# Patient Record
Sex: FEMALE | Race: WHITE | NOT HISPANIC OR LATINO | Employment: OTHER | URBAN - METROPOLITAN AREA
[De-identification: names, ages, dates, MRNs, and addresses within clinical notes are randomized per-mention and may not be internally consistent; named-entity substitution may affect disease eponyms.]

---

## 2017-01-04 ENCOUNTER — HISTORICAL (OUTPATIENT)
Dept: LAB | Facility: HOSPITAL | Age: 57
End: 2017-01-04

## 2017-01-24 ENCOUNTER — HISTORICAL (OUTPATIENT)
Dept: ADMINISTRATIVE | Facility: HOSPITAL | Age: 57
End: 2017-01-24

## 2017-04-25 ENCOUNTER — HISTORICAL (OUTPATIENT)
Dept: ADMINISTRATIVE | Facility: HOSPITAL | Age: 57
End: 2017-04-25

## 2017-05-02 ENCOUNTER — HISTORICAL (OUTPATIENT)
Dept: SURGERY | Facility: HOSPITAL | Age: 57
End: 2017-05-02

## 2017-05-15 ENCOUNTER — HISTORICAL (OUTPATIENT)
Dept: LAB | Facility: HOSPITAL | Age: 57
End: 2017-05-15

## 2017-05-15 LAB
ABS NEUT (OLG): 4.19 X10(3)/MCL (ref 2.1–9.2)
ALBUMIN SERPL-MCNC: 3.5 GM/DL (ref 3.4–5)
ALBUMIN/GLOB SERPL: 1.2 RATIO (ref 1.1–2)
ALP SERPL-CCNC: 88 UNIT/L (ref 38–126)
ALT SERPL-CCNC: 30 UNIT/L (ref 12–78)
AST SERPL-CCNC: 22 UNIT/L (ref 15–37)
BASOPHILS # BLD AUTO: 0 X10(3)/MCL (ref 0–0.2)
BASOPHILS NFR BLD AUTO: 0 %
BILIRUB SERPL-MCNC: 0.5 MG/DL (ref 0.2–1)
BILIRUBIN DIRECT+TOT PNL SERPL-MCNC: 0.1 MG/DL (ref 0–0.5)
BILIRUBIN DIRECT+TOT PNL SERPL-MCNC: 0.4 MG/DL (ref 0–0.8)
BUN SERPL-MCNC: 28 MG/DL (ref 7–18)
CALCIUM SERPL-MCNC: 8.8 MG/DL (ref 8.5–10.1)
CHLORIDE SERPL-SCNC: 108 MMOL/L (ref 98–107)
CHOLEST SERPL-MCNC: 161 MG/DL (ref 0–200)
CHOLEST/HDLC SERPL: 3.7 {RATIO} (ref 0–4)
CO2 SERPL-SCNC: 27 MMOL/L (ref 21–32)
CREAT SERPL-MCNC: 0.9 MG/DL (ref 0.55–1.02)
EOSINOPHIL # BLD AUTO: 0.1 X10(3)/MCL (ref 0–0.9)
EOSINOPHIL NFR BLD AUTO: 2 %
ERYTHROCYTE [DISTWIDTH] IN BLOOD BY AUTOMATED COUNT: 13 % (ref 11.5–17)
GLOBULIN SER-MCNC: 2.9 GM/DL (ref 2.4–3.5)
GLUCOSE SERPL-MCNC: 89 MG/DL (ref 74–106)
HCT VFR BLD AUTO: 39.6 % (ref 37–47)
HDLC SERPL-MCNC: 43 MG/DL (ref 35–60)
HGB BLD-MCNC: 12.5 GM/DL (ref 12–16)
LDLC SERPL CALC-MCNC: 91 MG/DL (ref 0–129)
LYMPHOCYTES # BLD AUTO: 1.5 X10(3)/MCL (ref 0.6–4.6)
LYMPHOCYTES NFR BLD AUTO: 24 %
MCH RBC QN AUTO: 28.2 PG (ref 27–31)
MCHC RBC AUTO-ENTMCNC: 31.6 GM/DL (ref 33–36)
MCV RBC AUTO: 89.4 FL (ref 80–94)
MONOCYTES # BLD AUTO: 0.4 X10(3)/MCL (ref 0.1–1.3)
MONOCYTES NFR BLD AUTO: 6 %
NEUTROPHILS # BLD AUTO: 4.19 X10(3)/MCL (ref 1.4–7.9)
NEUTROPHILS NFR BLD AUTO: 68 %
PLATELET # BLD AUTO: 121 X10(3)/MCL (ref 130–400)
PMV BLD AUTO: 11.2 FL (ref 9.4–12.4)
POTASSIUM SERPL-SCNC: 4.2 MMOL/L (ref 3.5–5.1)
PROT SERPL-MCNC: 6.4 GM/DL (ref 6.4–8.2)
RBC # BLD AUTO: 4.43 X10(6)/MCL (ref 4.2–5.4)
SODIUM SERPL-SCNC: 145 MMOL/L (ref 136–145)
TRIGL SERPL-MCNC: 133 MG/DL (ref 30–150)
TSH SERPL-ACNC: 1.87 MIU/ML (ref 0.36–3.74)
VLDLC SERPL CALC-MCNC: 27 MG/DL
WBC # SPEC AUTO: 6.2 X10(3)/MCL (ref 4.5–11.5)

## 2017-05-23 ENCOUNTER — HISTORICAL (OUTPATIENT)
Dept: ADMINISTRATIVE | Facility: HOSPITAL | Age: 57
End: 2017-05-23

## 2017-06-07 ENCOUNTER — HISTORICAL (OUTPATIENT)
Dept: LAB | Facility: HOSPITAL | Age: 57
End: 2017-06-07

## 2017-06-10 LAB
FINAL CULTURE: NO GROWTH
FINAL CULTURE: NORMAL

## 2017-07-10 LAB — FINAL CULTURE: NORMAL

## 2017-07-11 ENCOUNTER — HISTORICAL (OUTPATIENT)
Dept: CARDIOLOGY | Facility: HOSPITAL | Age: 57
End: 2017-07-11

## 2017-07-11 LAB
ABS NEUT (OLG): 2.84 X10(3)/MCL (ref 2.1–9.2)
APTT PPP: 29.9 SECOND(S) (ref 20.6–36)
BASOPHILS # BLD AUTO: 0 X10(3)/MCL (ref 0–0.2)
BASOPHILS NFR BLD AUTO: 0 %
EOSINOPHIL # BLD AUTO: 0.1 X10(3)/MCL (ref 0–0.9)
EOSINOPHIL NFR BLD AUTO: 2 %
ERYTHROCYTE [DISTWIDTH] IN BLOOD BY AUTOMATED COUNT: 12.7 % (ref 11.5–17)
HCT VFR BLD AUTO: 39.2 % (ref 37–47)
HGB BLD-MCNC: 12.9 GM/DL (ref 12–16)
INR PPP: 0.94 (ref 0–1.27)
LYMPHOCYTES # BLD AUTO: 2.2 X10(3)/MCL (ref 0.6–4.6)
LYMPHOCYTES NFR BLD AUTO: 39 %
MCH RBC QN AUTO: 28.4 PG (ref 27–31)
MCHC RBC AUTO-ENTMCNC: 32.9 GM/DL (ref 33–36)
MCV RBC AUTO: 86.3 FL (ref 80–94)
MONOCYTES # BLD AUTO: 0.5 X10(3)/MCL (ref 0.1–1.3)
MONOCYTES NFR BLD AUTO: 9 %
NEUTROPHILS # BLD AUTO: 2.84 X10(3)/MCL (ref 1.4–7.9)
NEUTROPHILS NFR BLD AUTO: 49 %
PLATELET # BLD AUTO: 164 X10(3)/MCL (ref 130–400)
PMV BLD AUTO: 10.8 FL (ref 9.4–12.4)
PROTHROMBIN TIME: 12.4 SECOND(S) (ref 12.1–14.2)
RBC # BLD AUTO: 4.54 X10(6)/MCL (ref 4.2–5.4)
WBC # SPEC AUTO: 5.8 X10(3)/MCL (ref 4.5–11.5)

## 2017-07-28 ENCOUNTER — HISTORICAL (OUTPATIENT)
Dept: LAB | Facility: HOSPITAL | Age: 57
End: 2017-07-28

## 2017-07-28 LAB
BILIRUB SERPL-MCNC: NEGATIVE MG/DL
BLOOD URINE, POC: NEGATIVE
CLARITY, POC UA: NORMAL
COLOR, POC UA: YELLOW
GLUCOSE UR QL STRIP: NEGATIVE
KETONES UR QL STRIP: NEGATIVE
LEUKOCYTE EST, POC UA: NORMAL
NITRITE, POC UA: NEGATIVE
PH, POC UA: 6.5
PROTEIN, POC: NEGATIVE
SPECIFIC GRAVITY, POC UA: 1.01
UROBILINOGEN, POC UA: NORMAL

## 2017-07-30 LAB — FINAL CULTURE: NO GROWTH

## 2017-09-20 ENCOUNTER — HISTORICAL (OUTPATIENT)
Dept: RADIOLOGY | Facility: HOSPITAL | Age: 57
End: 2017-09-20

## 2017-09-27 ENCOUNTER — HISTORICAL (OUTPATIENT)
Dept: LAB | Facility: HOSPITAL | Age: 57
End: 2017-09-27

## 2017-09-27 LAB
ABS NEUT (OLG): 3.3 X10(3)/MCL (ref 2.1–9.2)
ALBUMIN SERPL-MCNC: 3.8 GM/DL (ref 3.4–5)
ALBUMIN/GLOB SERPL: 1.3 {RATIO}
ALP SERPL-CCNC: 121 UNIT/L (ref 38–126)
ALT SERPL-CCNC: 26 UNIT/L (ref 12–78)
AST SERPL-CCNC: 19 UNIT/L (ref 15–37)
BASOPHILS # BLD AUTO: 0 X10(3)/MCL (ref 0–0.2)
BASOPHILS NFR BLD AUTO: 0 %
BILIRUB SERPL-MCNC: 0.3 MG/DL (ref 0.2–1)
BILIRUBIN DIRECT+TOT PNL SERPL-MCNC: 0.1 MG/DL (ref 0–0.2)
BILIRUBIN DIRECT+TOT PNL SERPL-MCNC: 0.2 MG/DL (ref 0–0.8)
BUN SERPL-MCNC: 15 MG/DL (ref 7–18)
CALCIUM SERPL-MCNC: 8.5 MG/DL (ref 8.5–10.1)
CHLORIDE SERPL-SCNC: 100 MMOL/L (ref 98–107)
CO2 SERPL-SCNC: 30 MMOL/L (ref 21–32)
CREAT SERPL-MCNC: 1.13 MG/DL (ref 0.55–1.02)
EOSINOPHIL # BLD AUTO: 0.1 X10(3)/MCL (ref 0–0.9)
EOSINOPHIL NFR BLD AUTO: 1 %
ERYTHROCYTE [DISTWIDTH] IN BLOOD BY AUTOMATED COUNT: 13.4 % (ref 11.5–17)
GLOBULIN SER-MCNC: 3 GM/DL (ref 2.4–3.5)
GLUCOSE SERPL-MCNC: 108 MG/DL (ref 74–106)
HCT VFR BLD AUTO: 39.4 % (ref 37–47)
HGB BLD-MCNC: 12.8 GM/DL (ref 12–16)
LYMPHOCYTES # BLD AUTO: 2.2 X10(3)/MCL (ref 0.6–4.6)
LYMPHOCYTES NFR BLD AUTO: 36 %
MCH RBC QN AUTO: 27.4 PG (ref 27–31)
MCHC RBC AUTO-ENTMCNC: 32.5 GM/DL (ref 33–36)
MCV RBC AUTO: 84.2 FL (ref 80–94)
MONOCYTES # BLD AUTO: 0.5 X10(3)/MCL (ref 0.1–1.3)
MONOCYTES NFR BLD AUTO: 8 %
NEUTROPHILS # BLD AUTO: 3.3 X10(3)/MCL (ref 2.1–9.2)
NEUTROPHILS NFR BLD AUTO: 54 %
PLATELET # BLD AUTO: 131 X10(3)/MCL (ref 130–400)
PMV BLD AUTO: 11.2 FL (ref 9.4–12.4)
POTASSIUM SERPL-SCNC: 2.8 MMOL/L (ref 3.5–5.1)
PROT SERPL-MCNC: 6.8 GM/DL (ref 6.4–8.2)
RBC # BLD AUTO: 4.68 X10(6)/MCL (ref 4.2–5.4)
SODIUM SERPL-SCNC: 138 MMOL/L (ref 136–145)
TSH SERPL-ACNC: 1.02 MIU/ML (ref 0.36–3.74)
WBC # SPEC AUTO: 6.1 X10(3)/MCL (ref 4.5–11.5)

## 2017-10-04 ENCOUNTER — HISTORICAL (OUTPATIENT)
Dept: LAB | Facility: HOSPITAL | Age: 57
End: 2017-10-04

## 2017-10-04 ENCOUNTER — HISTORICAL (OUTPATIENT)
Dept: RESPIRATORY THERAPY | Facility: HOSPITAL | Age: 57
End: 2017-10-04

## 2017-10-04 LAB
ABS NEUT (OLG): 9.37 X10(3)/MCL (ref 2.1–9.2)
ALBUMIN SERPL-MCNC: 3.8 GM/DL (ref 3.4–5)
ALBUMIN/GLOB SERPL: 1 {RATIO}
ALP SERPL-CCNC: 133 UNIT/L (ref 45–117)
ALT SERPL-CCNC: 52 UNIT/L (ref 13–56)
AST SERPL-CCNC: 23 UNIT/L (ref 15–37)
BILIRUB SERPL-MCNC: 0.3 MG/DL (ref 0.2–1)
BILIRUBIN DIRECT+TOT PNL SERPL-MCNC: 0.1 MG/DL (ref 0–0.2)
BILIRUBIN DIRECT+TOT PNL SERPL-MCNC: 0.2 MG/DL (ref 0–1)
BUN SERPL-MCNC: 20 MG/DL (ref 7–18)
CALCIUM SERPL-MCNC: 8.6 MG/DL (ref 8.5–10.1)
CHLORIDE SERPL-SCNC: 102 MMOL/L (ref 98–107)
CHOLEST SERPL-MCNC: 187 MG/DL (ref 0–200)
CHOLEST/HDLC SERPL: 3 {RATIO} (ref 0–4)
CO2 SERPL-SCNC: 30 MMOL/L (ref 21–32)
CORTIS SERPL-SCNC: 39.2 MCG/DL
CREAT SERPL-MCNC: 1.44 MG/DL (ref 0.55–1.02)
ERYTHROCYTE [DISTWIDTH] IN BLOOD BY AUTOMATED COUNT: 13.8 % (ref 11.5–17)
FT4I SERPL CALC-MCNC: 2.24 (ref 2.6–3.6)
GLOBULIN SER-MCNC: 4 GM/DL (ref 2–4)
GLUCOSE SERPL-MCNC: 89 MG/DL (ref 74–106)
HCT VFR BLD AUTO: 42.2 % (ref 37–47)
HDLC SERPL-MCNC: 63 MG/DL (ref 35–60)
HGB BLD-MCNC: 14.2 GM/DL (ref 12–16)
LDLC SERPL CALC-MCNC: 99 MG/DL (ref 0–129)
MCH RBC QN AUTO: 27.5 PG (ref 27–31)
MCHC RBC AUTO-ENTMCNC: 33.6 GM/DL (ref 33–36)
MCV RBC AUTO: 81.6 FL (ref 80–94)
PLATELET # BLD AUTO: 249 X10(3)/MCL (ref 130–400)
PMV BLD AUTO: 10.4 FL (ref 7.4–10.4)
POTASSIUM SERPL-SCNC: 4 MMOL/L (ref 3.5–5.1)
PROGEST SERPL-MCNC: <0.21 NG/ML
PROT SERPL-MCNC: 7.5 GM/DL (ref 6.4–8.2)
RBC # BLD AUTO: 5.17 X10(6)/MCL (ref 4.2–5.4)
SODIUM SERPL-SCNC: 140 MMOL/L (ref 136–145)
T3FREE SERPL-MCNC: 1.96 PG/ML (ref 2.18–3.98)
T3RU NFR SERPL: 32 % (ref 31–39)
T4 SERPL-MCNC: 7 MCG/DL (ref 4.7–13.3)
TESTOST SERPL-MCNC: <10 NG/DL (ref 14–76)
TRIGL SERPL-MCNC: 125 MG/DL (ref 30–150)
TSH SERPL-ACNC: 0.97 MIU/ML (ref 0.36–3.74)
VIT B12 SERPL-MCNC: 547 PG/ML (ref 193–986)
VLDLC SERPL CALC-MCNC: 25 MG/DL
WBC # SPEC AUTO: 12.1 X10(3)/MCL (ref 4.5–11.5)

## 2017-11-08 ENCOUNTER — HISTORICAL (OUTPATIENT)
Dept: LAB | Facility: HOSPITAL | Age: 57
End: 2017-11-08

## 2017-11-08 LAB
ABS NEUT (OLG): 6.05 X10(3)/MCL (ref 2.1–9.2)
BASOPHILS # BLD AUTO: 0.02 X10(3)/MCL (ref 0–0.2)
BASOPHILS NFR BLD AUTO: 0.2 % (ref 0–1)
BUN SERPL-MCNC: 17 MG/DL (ref 7–18)
CALCIUM SERPL-MCNC: 9.3 MG/DL (ref 8.5–10.1)
CHLORIDE SERPL-SCNC: 106 MMOL/L (ref 98–107)
CO2 SERPL-SCNC: 27 MMOL/L (ref 21–32)
CREAT SERPL-MCNC: 0.9 MG/DL (ref 0.55–1.02)
EOSINOPHIL # BLD AUTO: 0.04 X10(3)/MCL (ref 0–0.9)
EOSINOPHIL NFR BLD AUTO: 0.5 % (ref 0–6.4)
ERYTHROCYTE [DISTWIDTH] IN BLOOD BY AUTOMATED COUNT: 13.5 % (ref 11.5–17)
EST. AVERAGE GLUCOSE BLD GHB EST-MCNC: 117 MG/DL
GLUCOSE SERPL-MCNC: 100 MG/DL (ref 74–106)
HBA1C MFR BLD: 5.7 % (ref 4.2–6.3)
HCT VFR BLD AUTO: 40.8 % (ref 37–47)
HGB BLD-MCNC: 13.8 GM/DL (ref 12–16)
IMM GRANULOCYTES # BLD AUTO: 0.04 10*3/UL (ref 0–0.02)
IMM GRANULOCYTES NFR BLD AUTO: 0.5 % (ref 0–0.43)
LYMPHOCYTES # BLD AUTO: 2 X10(3)/MCL (ref 0.6–4.6)
LYMPHOCYTES NFR BLD AUTO: 22.9 % (ref 16–44)
MCH RBC QN AUTO: 28 PG (ref 27–31)
MCHC RBC AUTO-ENTMCNC: 33.8 GM/DL (ref 33–36)
MCV RBC AUTO: 82.8 FL (ref 80–94)
MONOCYTES # BLD AUTO: 0.6 X10(3)/MCL (ref 0.1–1.3)
MONOCYTES NFR BLD AUTO: 6.9 % (ref 4–12.1)
NEUTROPHILS # BLD AUTO: 6.05 X10(3)/MCL (ref 2.1–9.2)
NEUTROPHILS NFR BLD AUTO: 69 % (ref 43–73)
NRBC BLD AUTO-RTO: 0 % (ref 0–0.2)
PLATELET # BLD AUTO: 215 X10(3)/MCL (ref 130–400)
PMV BLD AUTO: 10.7 FL (ref 7.4–10.4)
POTASSIUM SERPL-SCNC: 3.9 MMOL/L (ref 3.5–5.1)
RBC # BLD AUTO: 4.93 X10(6)/MCL (ref 4.2–5.4)
SODIUM SERPL-SCNC: 141 MMOL/L (ref 136–145)
WBC # SPEC AUTO: 8.8 X10(3)/MCL (ref 4.5–11.5)

## 2017-11-14 ENCOUNTER — HISTORICAL (OUTPATIENT)
Dept: SURGERY | Facility: HOSPITAL | Age: 57
End: 2017-11-14

## 2017-12-14 ENCOUNTER — HISTORICAL (OUTPATIENT)
Dept: RADIOLOGY | Facility: HOSPITAL | Age: 57
End: 2017-12-14

## 2017-12-14 LAB
ABS NEUT (OLG): 3.67 X10(3)/MCL (ref 2.1–9.2)
BASOPHILS # BLD AUTO: 0.01 X10(3)/MCL (ref 0–0.2)
BASOPHILS NFR BLD AUTO: 0.2 % (ref 0–1)
EOSINOPHIL # BLD AUTO: 0.05 X10(3)/MCL (ref 0–0.9)
EOSINOPHIL NFR BLD AUTO: 0.9 % (ref 0–6.4)
ERYTHROCYTE [DISTWIDTH] IN BLOOD BY AUTOMATED COUNT: 13.2 % (ref 11.5–17)
HCT VFR BLD AUTO: 42.4 % (ref 37–47)
HGB BLD-MCNC: 14.2 GM/DL (ref 12–16)
IMM GRANULOCYTES # BLD AUTO: 0.01 10*3/UL (ref 0–0.02)
IMM GRANULOCYTES NFR BLD AUTO: 0.2 % (ref 0–0.43)
LYMPHOCYTES # BLD AUTO: 1.25 X10(3)/MCL (ref 0.6–4.6)
LYMPHOCYTES NFR BLD AUTO: 23.2 % (ref 16–44)
MCH RBC QN AUTO: 27.4 PG (ref 27–31)
MCHC RBC AUTO-ENTMCNC: 33.5 GM/DL (ref 33–36)
MCV RBC AUTO: 81.9 FL (ref 80–94)
MONOCYTES # BLD AUTO: 0.39 X10(3)/MCL (ref 0.1–1.3)
MONOCYTES NFR BLD AUTO: 7.2 % (ref 4–12.1)
NEUTROPHILS # BLD AUTO: 3.67 X10(3)/MCL (ref 2.1–9.2)
NEUTROPHILS NFR BLD AUTO: 68.3 % (ref 43–73)
NRBC BLD AUTO-RTO: 0 % (ref 0–0.2)
PLATELET # BLD AUTO: 108 X10(3)/MCL (ref 130–400)
PMV BLD AUTO: 11.4 FL (ref 7.4–10.4)
RBC # BLD AUTO: 5.18 X10(6)/MCL (ref 4.2–5.4)
WBC # SPEC AUTO: 5.4 X10(3)/MCL (ref 4.5–11.5)

## 2017-12-28 ENCOUNTER — HISTORICAL (OUTPATIENT)
Dept: RADIOLOGY | Facility: HOSPITAL | Age: 57
End: 2017-12-28

## 2018-01-12 ENCOUNTER — HISTORICAL (OUTPATIENT)
Dept: LAB | Facility: HOSPITAL | Age: 58
End: 2018-01-12

## 2018-01-12 LAB
BUN SERPL-MCNC: 21 MG/DL (ref 10–20)
CALCIUM SERPL-MCNC: 9.2 MG/DL (ref 8–10.5)
CHLORIDE SERPL-SCNC: 109 MMOL/L (ref 100–108)
CO2 SERPL-SCNC: 28 MMOL/L (ref 21–35)
CREAT SERPL-MCNC: 0.87 MG/DL (ref 0.7–1.3)
GLUCOSE SERPL-MCNC: 96 MG/DL (ref 75–116)
POTASSIUM SERPL-SCNC: 3.3 MMOL/L (ref 3.6–5.2)
SODIUM SERPL-SCNC: 146 MMOL/L (ref 135–145)

## 2018-01-25 ENCOUNTER — HISTORICAL (OUTPATIENT)
Dept: ADMINISTRATIVE | Facility: HOSPITAL | Age: 58
End: 2018-01-25

## 2018-01-25 LAB
BUN SERPL-MCNC: 15 MG/DL (ref 7–18)
CALCIUM SERPL-MCNC: 9.3 MG/DL (ref 8.5–10.1)
CHLORIDE SERPL-SCNC: 102 MMOL/L (ref 98–107)
CO2 SERPL-SCNC: 27 MMOL/L (ref 21–32)
CREAT SERPL-MCNC: 1.14 MG/DL (ref 0.55–1.02)
FT4I SERPL CALC-MCNC: 4.09 (ref 2.6–3.6)
GLUCOSE SERPL-MCNC: 100 MG/DL (ref 74–106)
POTASSIUM SERPL-SCNC: 2.9 MMOL/L (ref 3.5–5.1)
SODIUM SERPL-SCNC: 140 MMOL/L (ref 136–145)
T3FREE SERPL-MCNC: 4.89 PG/ML (ref 2.18–3.98)
T3RU NFR SERPL: 33 % (ref 31–39)
T4 SERPL-MCNC: 12.4 MCG/DL (ref 4.7–13.3)
TSH SERPL-ACNC: 2.42 MIU/ML (ref 0.36–3.74)

## 2018-02-02 ENCOUNTER — HISTORICAL (OUTPATIENT)
Dept: LAB | Facility: HOSPITAL | Age: 58
End: 2018-02-02

## 2018-02-19 ENCOUNTER — HISTORICAL (OUTPATIENT)
Dept: LAB | Facility: HOSPITAL | Age: 58
End: 2018-02-19

## 2018-03-09 ENCOUNTER — HISTORICAL (OUTPATIENT)
Dept: LAB | Facility: HOSPITAL | Age: 58
End: 2018-03-09

## 2018-03-09 ENCOUNTER — HISTORICAL (OUTPATIENT)
Dept: PREADMISSION TESTING | Facility: HOSPITAL | Age: 58
End: 2018-03-09

## 2018-03-12 ENCOUNTER — HISTORICAL (OUTPATIENT)
Dept: LAB | Facility: HOSPITAL | Age: 58
End: 2018-03-12

## 2018-03-12 ENCOUNTER — HISTORICAL (OUTPATIENT)
Dept: PREADMISSION TESTING | Facility: HOSPITAL | Age: 58
End: 2018-03-12

## 2018-03-14 ENCOUNTER — HISTORICAL (OUTPATIENT)
Dept: LAB | Facility: HOSPITAL | Age: 58
End: 2018-03-14

## 2018-03-14 ENCOUNTER — HISTORICAL (OUTPATIENT)
Dept: PREADMISSION TESTING | Facility: HOSPITAL | Age: 58
End: 2018-03-14

## 2018-03-16 ENCOUNTER — HISTORICAL (OUTPATIENT)
Dept: SURGERY | Facility: HOSPITAL | Age: 58
End: 2018-03-16

## 2018-06-13 ENCOUNTER — HISTORICAL (OUTPATIENT)
Dept: RADIOLOGY | Facility: HOSPITAL | Age: 58
End: 2018-06-13

## 2018-08-01 ENCOUNTER — HISTORICAL (OUTPATIENT)
Dept: RADIOLOGY | Facility: HOSPITAL | Age: 58
End: 2018-08-01

## 2018-08-15 ENCOUNTER — HISTORICAL (OUTPATIENT)
Dept: LAB | Facility: HOSPITAL | Age: 58
End: 2018-08-15

## 2018-08-15 LAB
ABS NEUT (OLG): 3.7 X10(3)/MCL (ref 1.5–6.9)
BASOPHILS # BLD AUTO: 0 X10(3)/MCL (ref 0–0.1)
BASOPHILS NFR BLD AUTO: 0 % (ref 0–1)
EOSINOPHIL # BLD AUTO: 0.2 X10(3)/MCL (ref 0–0.6)
EOSINOPHIL NFR BLD AUTO: 2 % (ref 0–5)
ERYTHROCYTE [DISTWIDTH] IN BLOOD BY AUTOMATED COUNT: 13.9 % (ref 11.5–17)
HCT VFR BLD AUTO: 41.4 % (ref 36–48)
HGB BLD-MCNC: 13.6 GM/DL (ref 12–16)
INR PPP: 0.9 (ref 0–1.2)
LYMPHOCYTES # BLD AUTO: 2.4 X10(3)/MCL (ref 0.5–4.1)
LYMPHOCYTES NFR BLD AUTO: 34.8 % (ref 15–40)
MCH RBC QN AUTO: 28 PG (ref 27–34)
MCHC RBC AUTO-ENTMCNC: 33 GM/DL (ref 31–36)
MCV RBC AUTO: 85 FL (ref 80–99)
MONOCYTES # BLD AUTO: 0.6 X10(3)/MCL (ref 0–1.1)
MONOCYTES NFR BLD AUTO: 8 % (ref 4–12)
NEUTROPHILS # BLD AUTO: 3.7 X10(3)/MCL (ref 1.5–6.9)
NEUTROPHILS NFR BLD AUTO: 54 % (ref 43–75)
PLATELET # BLD AUTO: 169 X10(3)/MCL (ref 140–400)
PMV BLD AUTO: 11.4 FL (ref 6.8–10)
PROTHROMBIN TIME: 9.8 SECOND(S) (ref 9–12)
RBC # BLD AUTO: 4.88 X10(6)/MCL (ref 4.2–5.4)
WBC # SPEC AUTO: 6.9 X10(3)/MCL (ref 4.5–11.5)

## 2018-08-21 ENCOUNTER — HISTORICAL (OUTPATIENT)
Dept: LAB | Facility: HOSPITAL | Age: 58
End: 2018-08-21

## 2018-08-21 LAB
ABS NEUT (OLG): 4.2 X10(3)/MCL (ref 1.5–6.9)
ALBUMIN SERPL-MCNC: 3.1 GM/DL (ref 3.4–5)
ALBUMIN/GLOB SERPL: 1 RATIO
ALP SERPL-CCNC: 109 UNIT/L (ref 30–113)
ALT SERPL-CCNC: 22 UNIT/L (ref 10–45)
APTT PPP: 23.5 SECOND(S) (ref 25–35)
AST SERPL-CCNC: 13 UNIT/L (ref 15–37)
BASOPHILS # BLD AUTO: 0 X10(3)/MCL (ref 0–0.1)
BASOPHILS NFR BLD AUTO: 0 % (ref 0–1)
BILIRUB SERPL-MCNC: 0.4 MG/DL (ref 0.1–0.9)
BILIRUBIN DIRECT+TOT PNL SERPL-MCNC: 0.1 MG/DL (ref 0–0.3)
BILIRUBIN DIRECT+TOT PNL SERPL-MCNC: 0.3 MG/DL
BUN SERPL-MCNC: 17 MG/DL (ref 10–20)
CALCIUM SERPL-MCNC: 8.7 MG/DL (ref 8–10.5)
CHLORIDE SERPL-SCNC: 106 MMOL/L (ref 100–108)
CHOLEST SERPL-MCNC: 180 MG/DL (ref 140–200)
CHOLEST/HDLC SERPL: 4 MG/DL (ref 0–8)
CO2 SERPL-SCNC: 36 MMOL/L (ref 21–35)
CREAT SERPL-MCNC: 0.92 MG/DL (ref 0.7–1.3)
DEPRECATED CALCIDIOL+CALCIFEROL SERPL-MC: 23.63 NG/ML (ref 30–80)
EOSINOPHIL # BLD AUTO: 0.1 X10(3)/MCL (ref 0–0.6)
EOSINOPHIL NFR BLD AUTO: 1 % (ref 0–5)
ERYTHROCYTE [DISTWIDTH] IN BLOOD BY AUTOMATED COUNT: 14.2 % (ref 11.5–17)
ERYTHROCYTE [SEDIMENTATION RATE] IN BLOOD: 12 MM/HR (ref 0–20)
FT4I SERPL CALC-MCNC: 3.5 (ref 5.9–13.1)
GLOBULIN SER-MCNC: 3.2 GM/DL
GLUCOSE SERPL-MCNC: 89 MG/DL (ref 75–116)
HCT VFR BLD AUTO: 40.8 % (ref 36–48)
HDLC SERPL-MCNC: 43 MG/DL (ref 35–59)
HGB BLD-MCNC: 13.3 GM/DL (ref 12–16)
INR PPP: 0.9 (ref 0–1.2)
LDLC SERPL CALC-MCNC: 115 MG/DL (ref 100–129)
LYMPHOCYTES # BLD AUTO: 2.5 X10(3)/MCL (ref 0.5–4.1)
LYMPHOCYTES NFR BLD AUTO: 33.7 % (ref 15–40)
MCH RBC QN AUTO: 28 PG (ref 27–34)
MCHC RBC AUTO-ENTMCNC: 33 GM/DL (ref 31–36)
MCV RBC AUTO: 86 FL (ref 80–99)
MONOCYTES # BLD AUTO: 0.5 X10(3)/MCL (ref 0–1.1)
MONOCYTES NFR BLD AUTO: 7 % (ref 4–12)
NEUTROPHILS # BLD AUTO: 4.2 X10(3)/MCL (ref 1.5–6.9)
NEUTROPHILS NFR BLD AUTO: 57 % (ref 43–75)
PLATELET # BLD AUTO: 163 X10(3)/MCL (ref 140–400)
PMV BLD AUTO: 11.2 FL (ref 6.8–10)
POTASSIUM SERPL-SCNC: 2.9 MMOL/L (ref 3.6–5.2)
PROT SERPL-MCNC: 6.3 GM/DL (ref 6.4–8.2)
PROTHROMBIN TIME: 9.7 SECOND(S) (ref 9–12)
RBC # BLD AUTO: 4.73 X10(6)/MCL (ref 4.2–5.4)
SODIUM SERPL-SCNC: 143 MMOL/L (ref 135–145)
T3RU NFR SERPL: 36 % (ref 30–39)
T4 SERPL-MCNC: 9.8 MCG/DL (ref 5.3–11.5)
TRIGL SERPL-MCNC: 192 MG/DL (ref 35–150)
TSH SERPL-ACNC: 5.06 MIU/ML (ref 0.36–3.74)
VIT B12 SERPL-MCNC: 499 PG/ML (ref 193–986)
VLDLC SERPL CALC-MCNC: 38 MG/DL
WBC # SPEC AUTO: 7.3 X10(3)/MCL (ref 4.5–11.5)

## 2018-08-29 ENCOUNTER — HISTORICAL (OUTPATIENT)
Dept: LAB | Facility: HOSPITAL | Age: 58
End: 2018-08-29

## 2018-08-29 LAB
BUN SERPL-MCNC: 20 MG/DL (ref 10–20)
CALCIUM SERPL-MCNC: 8.8 MG/DL (ref 8–10.5)
CHLORIDE SERPL-SCNC: 104 MMOL/L (ref 100–108)
CO2 SERPL-SCNC: 28 MMOL/L (ref 21–35)
CREAT SERPL-MCNC: 0.92 MG/DL (ref 0.7–1.3)
CREAT/UREA NIT SERPL: 22
GLUCOSE SERPL-MCNC: 97 MG/DL (ref 75–116)
POTASSIUM SERPL-SCNC: 3.5 MMOL/L (ref 3.6–5.2)
SODIUM SERPL-SCNC: 143 MMOL/L (ref 135–145)

## 2018-10-02 ENCOUNTER — HISTORICAL (OUTPATIENT)
Dept: LAB | Facility: HOSPITAL | Age: 58
End: 2018-10-02

## 2018-10-02 LAB
FT4I SERPL CALC-MCNC: 2.9 (ref 5.9–13.1)
T3FREE SERPL-MCNC: 3.76 PG/ML (ref 2.18–3.98)
T3RU NFR SERPL: 35 % (ref 30–39)
T4 SERPL-MCNC: 8.4 MCG/DL (ref 5.3–11.5)
TSH SERPL-ACNC: 2.19 MIU/ML (ref 0.36–3.74)

## 2018-10-03 ENCOUNTER — HISTORICAL (OUTPATIENT)
Dept: ADMINISTRATIVE | Facility: HOSPITAL | Age: 58
End: 2018-10-03

## 2018-10-03 ENCOUNTER — HISTORICAL (OUTPATIENT)
Dept: RADIOLOGY | Facility: HOSPITAL | Age: 58
End: 2018-10-03

## 2018-10-25 ENCOUNTER — HISTORICAL (OUTPATIENT)
Dept: RADIOLOGY | Facility: HOSPITAL | Age: 58
End: 2018-10-25

## 2019-06-11 ENCOUNTER — HISTORICAL (OUTPATIENT)
Dept: LAB | Facility: HOSPITAL | Age: 59
End: 2019-06-11

## 2019-06-11 LAB
ABS NEUT (OLG): 3.4 X10(3)/MCL (ref 1.5–6.9)
ALBUMIN SERPL-MCNC: 3.7 GM/DL (ref 3.4–5)
ALBUMIN/GLOB SERPL: 1.1 RATIO
ALP SERPL-CCNC: 122 UNIT/L (ref 30–113)
ALT SERPL-CCNC: 23 UNIT/L (ref 10–45)
AST SERPL-CCNC: 20 UNIT/L (ref 15–37)
BILIRUB SERPL-MCNC: 0.6 MG/DL (ref 0.1–0.9)
BILIRUBIN DIRECT+TOT PNL SERPL-MCNC: 0.1 MG/DL (ref 0–0.3)
BILIRUBIN DIRECT+TOT PNL SERPL-MCNC: 0.5 MG/DL
BUN SERPL-MCNC: 19 MG/DL (ref 10–20)
CALCIUM SERPL-MCNC: 9.2 MG/DL (ref 8–10.5)
CHLORIDE SERPL-SCNC: 101 MMOL/L (ref 100–108)
CHOLEST SERPL-MCNC: 190 MG/DL (ref 140–200)
CHOLEST/HDLC SERPL: 4 MG/DL (ref 0–8)
CO2 SERPL-SCNC: 30 MMOL/L (ref 21–35)
CREAT SERPL-MCNC: 1.01 MG/DL (ref 0.7–1.3)
ERYTHROCYTE [DISTWIDTH] IN BLOOD BY AUTOMATED COUNT: 13.3 % (ref 11.5–17)
EST. AVERAGE GLUCOSE BLD GHB EST-MCNC: 117 MG/DL
FT4I SERPL CALC-MCNC: 2.4 (ref 5.9–13.1)
GLOBULIN SER-MCNC: 3.5 GM/DL
GLUCOSE SERPL-MCNC: 136 MG/DL (ref 75–116)
HBA1C MFR BLD: 5.7 % (ref 4.8–6)
HCT VFR BLD AUTO: 43.5 % (ref 36–48)
HDLC SERPL-MCNC: 45 MG/DL (ref 35–59)
HGB BLD-MCNC: 13.8 GM/DL (ref 12–16)
LDLC SERPL CALC-MCNC: 125 MG/DL (ref 100–129)
MCH RBC QN AUTO: 28 PG (ref 27–34)
MCHC RBC AUTO-ENTMCNC: 32 GM/DL (ref 31–36)
MCV RBC AUTO: 87 FL (ref 80–99)
PLATELET # BLD AUTO: 175 X10(3)/MCL (ref 140–400)
PMV BLD AUTO: 11.3 FL (ref 6.8–10)
POTASSIUM SERPL-SCNC: 3.3 MMOL/L (ref 3.6–5.2)
PROT SERPL-MCNC: 7.2 GM/DL (ref 6.4–8.2)
RBC # BLD AUTO: 4.98 X10(6)/MCL (ref 4.2–5.4)
SODIUM SERPL-SCNC: 140 MMOL/L (ref 135–145)
T3FREE SERPL-MCNC: 3.08 PG/ML (ref 2.18–3.98)
T3RU NFR SERPL: 32 % (ref 30–39)
T4 SERPL-MCNC: 7.6 MCG/DL (ref 5.3–11.5)
TRIGL SERPL-MCNC: 171 MG/DL (ref 35–150)
TSH SERPL-ACNC: 1.55 MIU/ML (ref 0.36–3.74)
VLDLC SERPL CALC-MCNC: 34 MG/DL
WBC # SPEC AUTO: 5.6 X10(3)/MCL (ref 4.5–11.5)

## 2019-09-03 LAB — CRC RECOMMENDATION EXT: NORMAL

## 2019-10-03 ENCOUNTER — HISTORICAL (OUTPATIENT)
Dept: RADIOLOGY | Facility: HOSPITAL | Age: 59
End: 2019-10-03

## 2019-10-30 ENCOUNTER — HISTORICAL (OUTPATIENT)
Dept: LAB | Facility: HOSPITAL | Age: 59
End: 2019-10-30

## 2019-10-30 LAB
DEPRECATED CALCIDIOL+CALCIFEROL SERPL-MC: 25.9 NG/ML (ref 30–80)
VIT B12 SERPL-MCNC: 575 PG/ML (ref 193–986)

## 2019-12-17 ENCOUNTER — HISTORICAL (OUTPATIENT)
Dept: LAB | Facility: HOSPITAL | Age: 59
End: 2019-12-17

## 2019-12-17 LAB
ABS NEUT (OLG): 3.5 X10(3)/MCL (ref 1.5–6.9)
ALBUMIN SERPL-MCNC: 4.2 GM/DL (ref 3.4–5)
ALBUMIN/GLOB SERPL: 1.2 RATIO
ALP SERPL-CCNC: 115 UNIT/L (ref 30–113)
ALT SERPL-CCNC: 25 UNIT/L (ref 10–45)
AST SERPL-CCNC: 21 UNIT/L (ref 15–37)
BILIRUB SERPL-MCNC: 0.5 MG/DL (ref 0.1–0.9)
BILIRUBIN DIRECT+TOT PNL SERPL-MCNC: 0.1 MG/DL (ref 0–0.3)
BILIRUBIN DIRECT+TOT PNL SERPL-MCNC: 0.4 MG/DL
BUN SERPL-MCNC: 24 MG/DL (ref 10–20)
CALCIUM SERPL-MCNC: 10.1 MG/DL (ref 8–10.5)
CHLORIDE SERPL-SCNC: 103 MMOL/L (ref 100–108)
CHOLEST SERPL-MCNC: 233 MG/DL (ref 140–200)
CHOLEST/HDLC SERPL: 4 MG/DL (ref 0–8)
CO2 SERPL-SCNC: 27 MMOL/L (ref 21–35)
CORTIS SERPL-SCNC: 12 MCG/DL
CREAT SERPL-MCNC: 0.79 MG/DL (ref 0.7–1.3)
DEPRECATED CALCIDIOL+CALCIFEROL SERPL-MC: 35.46 NG/ML (ref 30–80)
ERYTHROCYTE [DISTWIDTH] IN BLOOD BY AUTOMATED COUNT: 13.3 % (ref 11.5–17)
ESTRADIOL SERPL HS-MCNC: 12 PG/ML
GLOBULIN SER-MCNC: 3.6 GM/DL
GLUCOSE SERPL-MCNC: 96 MG/DL (ref 75–116)
HCT VFR BLD AUTO: 43.3 % (ref 36–48)
HDLC SERPL-MCNC: 53 MG/DL (ref 35–59)
HGB BLD-MCNC: 14.1 GM/DL (ref 12–16)
LDLC SERPL CALC-MCNC: 172 MG/DL (ref 100–129)
MCH RBC QN AUTO: 28 PG (ref 27–34)
MCHC RBC AUTO-ENTMCNC: 33 GM/DL (ref 31–36)
MCV RBC AUTO: 87 FL (ref 80–99)
PLATELET # BLD AUTO: 190 X10(3)/MCL (ref 140–400)
PMV BLD AUTO: 11 FL (ref 6.8–10)
POTASSIUM SERPL-SCNC: 3.6 MMOL/L (ref 3.6–5.2)
PROT SERPL-MCNC: 7.8 GM/DL (ref 6.4–8.2)
RBC # BLD AUTO: 4.97 X10(6)/MCL (ref 4.2–5.4)
SODIUM SERPL-SCNC: 142 MMOL/L (ref 135–145)
T3FREE SERPL-MCNC: 3.34 PG/ML (ref 2.18–3.98)
TRIGL SERPL-MCNC: 147 MG/DL (ref 35–150)
TSH SERPL-ACNC: 2.71 MIU/ML (ref 0.36–3.74)
VIT B12 SERPL-MCNC: 467 PG/ML (ref 193–986)
VLDLC SERPL CALC-MCNC: 29 MG/DL
WBC # SPEC AUTO: 5.7 X10(3)/MCL (ref 4.5–11.5)

## 2020-02-18 ENCOUNTER — HISTORICAL (OUTPATIENT)
Dept: RADIOLOGY | Facility: HOSPITAL | Age: 60
End: 2020-02-18

## 2020-05-25 ENCOUNTER — HISTORICAL (OUTPATIENT)
Dept: LAB | Facility: HOSPITAL | Age: 60
End: 2020-05-25

## 2020-05-25 LAB
ALBUMIN SERPL-MCNC: 3.9 GM/DL (ref 3.5–5)
ALBUMIN/GLOB SERPL: 1.3 RATIO (ref 1.1–2)
ALP SERPL-CCNC: 107 UNIT/L (ref 40–150)
ALT SERPL-CCNC: 11 UNIT/L (ref 0–55)
AST SERPL-CCNC: 18 UNIT/L (ref 5–34)
BILIRUB SERPL-MCNC: 0.5 MG/DL
BILIRUBIN DIRECT+TOT PNL SERPL-MCNC: 0.2 MG/DL (ref 0–0.5)
BILIRUBIN DIRECT+TOT PNL SERPL-MCNC: 0.3 MG/DL (ref 0–0.8)
BUN SERPL-MCNC: 26 MG/DL (ref 9.8–20.1)
CALCIUM SERPL-MCNC: 9.3 MG/DL (ref 8.4–10.2)
CHLORIDE SERPL-SCNC: 107 MMOL/L (ref 98–107)
CO2 SERPL-SCNC: 21 MMOL/L (ref 22–29)
CREAT SERPL-MCNC: 0.96 MG/DL (ref 0.55–1.02)
GLOBULIN SER-MCNC: 3 GM/DL (ref 2.4–3.5)
GLUCOSE SERPL-MCNC: 99 MG/DL (ref 74–100)
MAGNESIUM SERPL-MCNC: 2.03 MG/DL (ref 1.6–2.6)
POTASSIUM SERPL-SCNC: 4 MMOL/L (ref 3.5–5.1)
PROT SERPL-MCNC: 6.9 GM/DL (ref 6.4–8.3)
SODIUM SERPL-SCNC: 137 MMOL/L (ref 136–145)

## 2020-12-04 ENCOUNTER — HISTORICAL (OUTPATIENT)
Dept: RADIOLOGY | Facility: HOSPITAL | Age: 60
End: 2020-12-04

## 2021-06-02 ENCOUNTER — HISTORICAL (OUTPATIENT)
Dept: LAB | Facility: HOSPITAL | Age: 61
End: 2021-06-02

## 2021-06-02 LAB
ABS NEUT (OLG): 3.73 X10(3)/MCL (ref 2.1–9.2)
ALBUMIN SERPL-MCNC: 4.2 GM/DL (ref 3.5–5)
ALBUMIN/GLOB SERPL: 1.3 RATIO (ref 1.1–2)
ALP SERPL-CCNC: 118 UNIT/L (ref 40–150)
ALT SERPL-CCNC: 14 UNIT/L (ref 0–55)
ANTINUCLEAR ANTIBODY SCREEN (OHS): NEGATIVE
APTT PPP: 30.5 SEC (ref 23.4–34.9)
AST SERPL-CCNC: 17 UNIT/L (ref 5–34)
BASOPHILS # BLD AUTO: 0.02 X10(3)/MCL (ref 0–0.2)
BASOPHILS NFR BLD AUTO: 0.3 % (ref 0–1)
BILIRUB SERPL-MCNC: 0.6 MG/DL (ref 0.2–1.2)
BILIRUBIN DIRECT+TOT PNL SERPL-MCNC: 0.2 MG/DL (ref 0–0.5)
BILIRUBIN DIRECT+TOT PNL SERPL-MCNC: 0.4 MG/DL (ref 0–0.8)
BUN SERPL-MCNC: 15.2 MG/DL (ref 9.8–20.1)
CALCIUM SERPL-MCNC: 9.6 MG/DL (ref 8.4–10.2)
CHLORIDE SERPL-SCNC: 102 MMOL/L (ref 98–107)
CO2 SERPL-SCNC: 28 MMOL/L (ref 22–29)
CREAT SERPL-MCNC: 0.83 MG/DL (ref 0.57–1.11)
DSDNA ANTIBODY (OHS): NEGATIVE
EOSINOPHIL # BLD AUTO: 0.1 X10(3)/MCL (ref 0–0.9)
EOSINOPHIL NFR BLD AUTO: 1.7 % (ref 0–6.4)
ERYTHROCYTE [DISTWIDTH] IN BLOOD BY AUTOMATED COUNT: 13 % (ref 11.5–17)
ERYTHROCYTE [SEDIMENTATION RATE] IN BLOOD: 8 MM/HR (ref 0–30)
GLOBULIN SER-MCNC: 3.3 GM/DL (ref 2.4–3.5)
GLUCOSE SERPL-MCNC: 96 MG/DL (ref 74–100)
HCT VFR BLD AUTO: 44.9 % (ref 37–47)
HGB BLD-MCNC: 14.4 GM/DL (ref 12–16)
IMM GRANULOCYTES # BLD AUTO: 0.02 10*3/UL (ref 0–0.02)
IMM GRANULOCYTES NFR BLD AUTO: 0.3 % (ref 0–0.43)
INR PPP: 1.1 (ref 2–3)
LYMPHOCYTES # BLD AUTO: 1.78 X10(3)/MCL (ref 0.6–4.6)
LYMPHOCYTES NFR BLD AUTO: 29.4 % (ref 16–44)
MCH RBC QN AUTO: 27.6 PG (ref 27–31)
MCHC RBC AUTO-ENTMCNC: 32.1 GM/DL (ref 33–36)
MCV RBC AUTO: 86.2 FL (ref 80–94)
MONOCYTES # BLD AUTO: 0.4 X10(3)/MCL (ref 0.1–1.3)
MONOCYTES NFR BLD AUTO: 6.6 % (ref 4–12.1)
NEUTROPHILS # BLD AUTO: 3.73 X10(3)/MCL (ref 2.1–9.2)
NEUTROPHILS NFR BLD AUTO: 61.7 % (ref 43–73)
NRBC BLD AUTO-RTO: 0 % (ref 0–0.2)
PLATELET # BLD AUTO: 162 X10(3)/MCL (ref 130–400)
PMV BLD AUTO: 11 FL (ref 7.4–10.4)
POTASSIUM SERPL-SCNC: 3.8 MMOL/L (ref 3.5–5.1)
PROT SERPL-MCNC: 7.5 GM/DL (ref 6.4–8.3)
PROTHROMBIN TIME: 13.5 SEC (ref 11.7–14.5)
RBC # BLD AUTO: 5.21 X10(6)/MCL (ref 4.2–5.4)
SODIUM SERPL-SCNC: 141 MMOL/L (ref 136–145)
TSH SERPL-ACNC: 1.9 UIU/ML (ref 0.35–4.94)
WBC # SPEC AUTO: 6 X10(3)/MCL (ref 4.5–11.5)

## 2021-06-07 ENCOUNTER — HISTORICAL (OUTPATIENT)
Dept: RADIOLOGY | Facility: HOSPITAL | Age: 61
End: 2021-06-07

## 2021-06-28 ENCOUNTER — HISTORICAL (OUTPATIENT)
Dept: ADMINISTRATIVE | Facility: HOSPITAL | Age: 61
End: 2021-06-28

## 2021-06-28 LAB — GROUP & RH: NORMAL

## 2021-07-21 ENCOUNTER — HISTORICAL (OUTPATIENT)
Dept: LAB | Facility: HOSPITAL | Age: 61
End: 2021-07-21

## 2021-07-21 LAB
ABS NEUT (OLG): 3.93 X10(3)/MCL (ref 2.1–9.2)
ALBUMIN SERPL-MCNC: 3.8 GM/DL (ref 3.5–5)
ALBUMIN/GLOB SERPL: 1.2 RATIO (ref 1.1–2)
ALP SERPL-CCNC: 117 UNIT/L (ref 40–150)
ALT SERPL-CCNC: 11 UNIT/L (ref 0–55)
AST SERPL-CCNC: 16 UNIT/L (ref 5–34)
BASOPHILS # BLD AUTO: 0.01 X10(3)/MCL (ref 0–0.2)
BASOPHILS NFR BLD AUTO: 0.2 % (ref 0–1)
BILIRUB SERPL-MCNC: 0.3 MG/DL (ref 0.2–1.2)
BILIRUBIN DIRECT+TOT PNL SERPL-MCNC: <0.1 MG/DL (ref 0–0.5)
BILIRUBIN DIRECT+TOT PNL SERPL-MCNC: >0.2 MG/DL (ref 0–0.8)
BUN SERPL-MCNC: 19.6 MG/DL (ref 9.8–20.1)
CALCIUM SERPL-MCNC: 9.7 MG/DL (ref 8.4–10.2)
CHLORIDE SERPL-SCNC: 106 MMOL/L (ref 98–107)
CO2 SERPL-SCNC: 29 MMOL/L (ref 22–29)
CREAT SERPL-MCNC: 1.13 MG/DL (ref 0.57–1.11)
EOSINOPHIL # BLD AUTO: 0.16 X10(3)/MCL (ref 0–0.9)
EOSINOPHIL NFR BLD AUTO: 2.4 % (ref 0–6.4)
ERYTHROCYTE [DISTWIDTH] IN BLOOD BY AUTOMATED COUNT: 13.2 % (ref 11.5–17)
GLOBULIN SER-MCNC: 3.2 GM/DL (ref 2.4–3.5)
GLUCOSE SERPL-MCNC: 70 MG/DL (ref 74–100)
HCT VFR BLD AUTO: 41.6 % (ref 37–47)
HGB BLD-MCNC: 13.3 GM/DL (ref 12–16)
IMM GRANULOCYTES # BLD AUTO: 0.03 10*3/UL (ref 0–0.02)
IMM GRANULOCYTES NFR BLD AUTO: 0.5 % (ref 0–0.43)
LYMPHOCYTES # BLD AUTO: 1.89 X10(3)/MCL (ref 0.6–4.6)
LYMPHOCYTES NFR BLD AUTO: 28.8 % (ref 16–44)
MCH RBC QN AUTO: 28.5 PG (ref 27–31)
MCHC RBC AUTO-ENTMCNC: 32 GM/DL (ref 33–36)
MCV RBC AUTO: 89.3 FL (ref 80–94)
MONOCYTES # BLD AUTO: 0.54 X10(3)/MCL (ref 0.1–1.3)
MONOCYTES NFR BLD AUTO: 8.2 % (ref 4–12.1)
NEUTROPHILS # BLD AUTO: 3.93 X10(3)/MCL (ref 2.1–9.2)
NEUTROPHILS NFR BLD AUTO: 59.9 % (ref 43–73)
NRBC BLD AUTO-RTO: 0 % (ref 0–0.2)
PLATELET # BLD AUTO: 192 X10(3)/MCL (ref 130–400)
PMV BLD AUTO: 10.7 FL (ref 7.4–10.4)
POTASSIUM SERPL-SCNC: 3.4 MMOL/L (ref 3.5–5.1)
PROT SERPL-MCNC: 7 GM/DL (ref 6.4–8.3)
RBC # BLD AUTO: 4.66 X10(6)/MCL (ref 4.2–5.4)
SODIUM SERPL-SCNC: 146 MMOL/L (ref 136–145)
WBC # SPEC AUTO: 6.6 X10(3)/MCL (ref 4.5–11.5)

## 2021-11-18 ENCOUNTER — HISTORICAL (OUTPATIENT)
Dept: LAB | Facility: HOSPITAL | Age: 61
End: 2021-11-18

## 2021-11-18 LAB
ABS NEUT (OLG): 2.8 X10(3)/MCL (ref 1.5–6.9)
ALBUMIN SERPL-MCNC: 3.7 GM/DL (ref 3.4–4.8)
ALBUMIN/GLOB SERPL: 1.4 RATIO (ref 1.1–2)
ALP SERPL-CCNC: 115 UNIT/L (ref 40–150)
ALT SERPL-CCNC: 14 UNIT/L (ref 0–55)
APTT PPP: 27.1 SEC (ref 23.4–34.9)
AST SERPL-CCNC: 15 UNIT/L (ref 5–34)
BASOPHILS # BLD AUTO: 0 X10(3)/MCL (ref 0–0.1)
BASOPHILS NFR BLD AUTO: 0 % (ref 0–1)
BILIRUB SERPL-MCNC: 0.4 MG/DL
BILIRUBIN DIRECT+TOT PNL SERPL-MCNC: 0.1 MG/DL (ref 0–0.5)
BILIRUBIN DIRECT+TOT PNL SERPL-MCNC: 0.3 MG/DL (ref 0–0.8)
BUN SERPL-MCNC: 12 MG/DL (ref 9.8–20.1)
CALCIUM SERPL-MCNC: 9.1 MG/DL (ref 8.7–10.5)
CHLORIDE SERPL-SCNC: 108 MMOL/L (ref 98–107)
CHOLEST SERPL-MCNC: 210 MG/DL
CHOLEST/HDLC SERPL: 5 {RATIO} (ref 0–5)
CO2 SERPL-SCNC: 27 MMOL/L (ref 23–31)
CREAT SERPL-MCNC: 0.85 MG/DL (ref 0.55–1.02)
DEPRECATED CALCIDIOL+CALCIFEROL SERPL-MC: 30.7 NG/ML (ref 30–80)
EOSINOPHIL # BLD AUTO: 0.1 X10(3)/MCL (ref 0–0.6)
EOSINOPHIL NFR BLD AUTO: 2 % (ref 0–5)
ERYTHROCYTE [DISTWIDTH] IN BLOOD BY AUTOMATED COUNT: 13.1 % (ref 11.5–17)
EST. AVERAGE GLUCOSE BLD GHB EST-MCNC: 108.3 MG/DL
GLOBULIN SER-MCNC: 2.6 GM/DL (ref 2.4–3.5)
GLUCOSE SERPL-MCNC: 113 MG/DL (ref 82–115)
HBA1C MFR BLD: 5.4 %
HCT VFR BLD AUTO: 40.8 % (ref 36–48)
HDLC SERPL-MCNC: 40 MG/DL (ref 35–60)
HGB BLD-MCNC: 13 GM/DL (ref 12–16)
IMM GRANULOCYTES # BLD AUTO: 0.01 10*3/UL (ref 0–0.02)
IMM GRANULOCYTES NFR BLD AUTO: 0.2 % (ref 0–0.43)
INR PPP: 1 (ref 2–3)
LDLC SERPL CALC-MCNC: 119 MG/DL (ref 50–140)
LYMPHOCYTES # BLD AUTO: 1.7 X10(3)/MCL (ref 0.5–4.1)
LYMPHOCYTES NFR BLD AUTO: 35 % (ref 15–40)
MCH RBC QN AUTO: 28 PG (ref 27–34)
MCHC RBC AUTO-ENTMCNC: 32 GM/DL (ref 31–36)
MCV RBC AUTO: 88 FL (ref 80–99)
MONOCYTES # BLD AUTO: 0.4 X10(3)/MCL (ref 0–1.1)
MONOCYTES NFR BLD AUTO: 7 % (ref 4–12)
NEUTROPHILS # BLD AUTO: 2.8 X10(3)/MCL (ref 1.5–6.9)
NEUTROPHILS NFR BLD AUTO: 55 % (ref 43–75)
PLATELET # BLD AUTO: 150 X10(3)/MCL (ref 140–400)
PMV BLD AUTO: 12.3 FL (ref 6.8–10)
POTASSIUM SERPL-SCNC: 4.3 MMOL/L (ref 3.5–5.1)
PROT SERPL-MCNC: 6.3 GM/DL (ref 5.8–7.6)
PROTHROMBIN TIME: 12.5 SEC (ref 11.7–14.5)
RBC # BLD AUTO: 4.66 X10(6)/MCL (ref 4.2–5.4)
SODIUM SERPL-SCNC: 141 MMOL/L (ref 136–145)
T3FREE SERPL-MCNC: 3.09 PG/ML (ref 1.58–3.91)
T4 SERPL-MCNC: 7.79 UG/DL (ref 4.87–11.72)
TRIGL SERPL-MCNC: 253 MG/DL (ref 37–140)
TSH SERPL-ACNC: 2.82 UIU/ML (ref 0.35–4.94)
VLDLC SERPL CALC-MCNC: 51 MG/DL
WBC # SPEC AUTO: 5 X10(3)/MCL (ref 4.5–11.5)

## 2022-03-24 ENCOUNTER — HISTORICAL (OUTPATIENT)
Dept: LAB | Facility: HOSPITAL | Age: 62
End: 2022-03-24

## 2022-03-24 LAB
ABS NEUT (OLG): 3.9 (ref 1.5–6.9)
ALBUMIN SERPL-MCNC: 4 G/DL (ref 3.4–4.8)
ALBUMIN/GLOB SERPL: 1.4 {RATIO} (ref 1.1–2)
ALP SERPL-CCNC: 125 U/L (ref 40–150)
ALT SERPL-CCNC: 16 U/L (ref 0–55)
AST SERPL-CCNC: 19 U/L (ref 5–34)
BASOPHILS # BLD AUTO: 0 10*3/UL (ref 0–0.1)
BASOPHILS NFR BLD AUTO: 0 % (ref 0–1)
BILIRUB SERPL-MCNC: 0.7 MG/DL
BILIRUBIN DIRECT+TOT PNL SERPL-MCNC: 0.2 (ref 0–0.5)
BILIRUBIN DIRECT+TOT PNL SERPL-MCNC: 0.5 (ref 0–0.8)
BUN SERPL-MCNC: 11 MG/DL (ref 9.8–20.1)
CALCIUM SERPL-MCNC: 9.2 MG/DL (ref 8.7–10.5)
CHLORIDE SERPL-SCNC: 105 MMOL/L (ref 98–107)
CHOLEST SERPL-MCNC: 229 MG/DL
CHOLEST/HDLC SERPL: 5 {RATIO} (ref 0–5)
CO2 SERPL-SCNC: 28 MMOL/L (ref 23–31)
CREAT SERPL-MCNC: 0.81 MG/DL (ref 0.55–1.02)
DEPRECATED CALCIDIOL+CALCIFEROL SERPL-MC: 29.2 NG/ML (ref 30–80)
EOSINOPHIL # BLD AUTO: 0.2 10*3/UL (ref 0–0.6)
EOSINOPHIL NFR BLD AUTO: 4 % (ref 0–5)
ERYTHROCYTE [DISTWIDTH] IN BLOOD BY AUTOMATED COUNT: 13.4 % (ref 11.5–17)
EST. AVERAGE GLUCOSE BLD GHB EST-MCNC: 108.3 MG/DL
GLOBULIN SER-MCNC: 2.9 G/DL (ref 2.4–3.5)
GLUCOSE SERPL-MCNC: 108 MG/DL (ref 82–115)
HBA1C MFR BLD: 5.4 %
HCT VFR BLD AUTO: 44.8 % (ref 36–48)
HDLC SERPL-MCNC: 46 MG/DL (ref 35–60)
HEMOLYSIS INTERF INDEX SERPL-ACNC: <0
HGB BLD-MCNC: 14.1 G/DL (ref 12–16)
ICTERIC INTERF INDEX SERPL-ACNC: 1
IMM GRANULOCYTES # BLD AUTO: 0.02 10*3/UL (ref 0–0.02)
IMM GRANULOCYTES NFR BLD AUTO: 0.3 % (ref 0–0.43)
LDLC SERPL CALC-MCNC: 162 MG/DL (ref 50–140)
LIPEMIC INTERF INDEX SERPL-ACNC: 1
LYMPHOCYTES # BLD AUTO: 1.4 10*3/UL (ref 0.5–4.1)
LYMPHOCYTES NFR BLD AUTO: 23 % (ref 15–40)
MANUAL DIFF? (OHS): NO
MCH RBC QN AUTO: 28 PG (ref 27–34)
MCHC RBC AUTO-ENTMCNC: 32 G/DL (ref 31–36)
MCV RBC AUTO: 88 FL (ref 80–99)
MONOCYTES # BLD AUTO: 0.4 10*3/UL (ref 0–1.1)
MONOCYTES NFR BLD AUTO: 7 % (ref 4–12)
NEUTROPHILS # BLD AUTO: 3.9 10*3/UL (ref 1.5–6.9)
NEUTROPHILS NFR BLD AUTO: 66 % (ref 43–75)
PLATELET # BLD AUTO: 199 10*3/UL (ref 140–400)
PMV BLD AUTO: 11.4 FL (ref 6.8–10)
POTASSIUM SERPL-SCNC: 4.1 MMOL/L (ref 3.5–5.1)
PROT SERPL-MCNC: 6.9 G/DL (ref 5.8–7.6)
RBC # BLD AUTO: 5.12 10*6/UL (ref 4.2–5.4)
SODIUM SERPL-SCNC: 140 MMOL/L (ref 136–145)
T3FREE SERPL-MCNC: 3.23 PG/ML (ref 1.58–3.91)
T3RU NFR SERPL: 31.3 % (ref 31–39)
T4 SERPL-MCNC: 9.33 UG/DL (ref 4.87–11.72)
TRIGL SERPL-MCNC: 105 MG/DL (ref 37–140)
TSH SERPL-ACNC: 2.9 M[IU]/L (ref 0.35–4.94)
VLDLC SERPL CALC-MCNC: 21 MG/DL
WBC # SPEC AUTO: 5.9 10*3/UL (ref 4.5–11.5)

## 2022-04-10 ENCOUNTER — HISTORICAL (OUTPATIENT)
Dept: ADMINISTRATIVE | Facility: HOSPITAL | Age: 62
End: 2022-04-10

## 2022-04-27 VITALS
SYSTOLIC BLOOD PRESSURE: 130 MMHG | OXYGEN SATURATION: 93 % | WEIGHT: 197.06 LBS | HEIGHT: 65 IN | BODY MASS INDEX: 32.83 KG/M2 | DIASTOLIC BLOOD PRESSURE: 62 MMHG

## 2022-04-30 NOTE — H&P
Patient:   Belen Andrews            MRN: [id number]            FIN: [id number]               Age:   60 years     Sex:  Female     :  1960   Associated Diagnoses:   None   Author:   Lorelei Mcclure      Health Status   The H&P was reviewed, the patient was examined, and there are no changes to the patient's condition..

## 2022-04-30 NOTE — OP NOTE
DATE OF SURGERY:    05/23/2017    SURGEON:  Waldemar Finley DPM    PREOPERATIVE DIAGNOSES:    1. Achilles tendon rupture, right foot.    2. Gastroc equinus on right ankle.    POSTOPERATIVE DIAGNOSES:    1. Achilles tendon rupture, right foot.    2. Gastroc equinus on right ankle.    NAME OF OPERATION:    1. Primary repair of Achilles tendon rupture, right foot.  2. Gastroc recession, right leg.  3. Application of a posterior splint.    ANESTHESIA:  General with local.    HEMOSTASIS:  Pneumatic thigh tourniquet.    ESTIMATED BLOOD LOSS:  Minimal.    MATERIALS:  Arthrex 5.5 mm corkscrew anchor x2.    INJECTABLES:  20 cc of a 1:1 mixture of 1% lidocaine plain, and 0.5% Marcaine plain.    PROCEDURE IN DETAIL:  Under mild sedation, the patient was brought into the operating room and placed on the operating table in a prone position.  A pneumatic thigh tourniquet was then placed about the patient's right thigh.  Following IV sedation, local anesthesia was obtained about the right leg and ankle utilizing 20 mL of 1:1 mixture of 1% lidocaine plain, and 0.5% Marcaine plain.  The foot and leg were then scrubbed, prepped and draped in the usual aseptic manner.  An Esmarch bandage was utilized to exsanguinate the patient's right foot and leg, and a pneumatic thigh tourniquet was inflated to 300 mmHg.     Attention was then directed to the posterior aspect of the right leg at which time, a 3 cm linear longitudinal incision was made at the gastroc aponeurosis just medial to the midline to avoid the sural nerve.  The incision was deepened through the subcutaneous tissues, using sharp and blunt dissection.  Care was taken to identify and retract all vital, neural and vascular structures.  At this time, the paratenon was then freed, following the same course of the incision, and reflected medially and laterally, thus exposing the gastroc aponeurosis at the operative site.  At this time, a transverse recession was  performed to the gastroc aponeurosis, and the underlying muscle belly was then identified.  This was done under tension of the foot, and adequate release of the gastroc was achieved.  The wound was flushed with copious amounts of sterile normal saline.  The paratenon was reapproximated and coapted utilizing 4-0 Vicryl, and the paratenon was closed utilizing 4-0 Monocryl, and skin closure was obtained utilizing 4-0 Monocryl in a continuously running subcuticular suture technique.  Mastisol is and Steri-Strips were then applied to the incision site.     Attention was then directed to the posterior aspect of the right ankle over the Achilles tendon, at which time, the previous surgical incision was identified.  The stitches were removed and passed from the operative field, and an incision was made following the course of the previous incision site.  At this time, the incision was deepened through the subcutaneous tissues using sharp and blunt dissection.  Care was taken to identify and retract all vital, neural and vascular structures.  All bleeders were ligated and cauterized as necessary.  At this time, the tendon was noted to be ripped off the posterior aspect of the calcaneus and was retracted approximately 2 cm from the posterior aspect of the calcaneus.  The previous FiberWire and Vicryl sutures were identified and removed as well, and passed from the operative field, and the previous anchors were removed and passed from the operative field as well.  Attention was then directed to the retracted Achilles stump and at this time, the unhealthy shredded parts of the distal aspect of the tendon were resected as necessary, and passed from the operative field.  Any hematoma was removed along the tendon at the incision site as well.  The wound was then flushed with copious amounts of sterile normal saline.  At this time, a 5.5 mm corkscrew anchor x2 was then reinserted back into the holes of the previous anchor  placement, and at this time the 2-0 FiberWire was then utilized to reattach the Achilles tendon back to the posterior superior aspect of the calcaneus.  This was done utilizing a buried suture technique as well as a Krackow stitch technique.  The tendon was then reinforced along the medial, lateral, and distal edges utilizing 2-0 Vicryl and the remaining 2-0 FiberWire, and skin closure was obtained utilizing 4-0 Monocryl and then 4-0 and 3-0 nylon in simple interrupted and horizontal mattress suture technique.     Upon completion of the procedure, the incisions were dressed with Xeroform and covered with sterile compressive dressings consisting of 4x4s, and Kerlix.  The pneumatic ankle tourniquet was then deflated.  A posterior splint and Ace wrap were then applied.       The patient tolerated the procedure and anesthesia well.  She will be transferred to the recovery room with stable vital signs and vascular status intact to all toes of the right foot.  Following a period of postoperative monitoring, the patient will be discharged home per Anesthesia.        ______________________________  Waldemar Finley DPM    JG/UD  DD:  05/30/2017  Time:  10:33PM  DT:  05/31/2017  Time:  11:11AM  Job #:  [id number]

## 2022-04-30 NOTE — OP NOTE
DATE OF SURGERY:        SURGEON:  Lavon Rosenbaum MD    ASSISTANT:  JANEL Webb    PREOPERATIVE DIAGNOSIS:  Severe back pain, leg pain, failed back syndrome.    POSTOPERATIVE DIAGNOSIS:  Severe back pain, leg pain, failed back syndrome.    PROCEDURE:  Thoracic laminotomy, placement of Nevro lead, battery pouch creation on the right side.    COMPLICATIONS:  No complications.    INDICATIONS FOR SURGERY:  The patient is a 57-year-old female  with lumbar pain, back pain, leg pain, here for thoracic laminotomy with placement of lead and battery.  The risks and benefits were discussed in detail, risks being bleeding, infection, weakness, possible CSF leak, possibility this may not work at all, possibly need further surgery or reoperation. They understand and want to proceed with surgery.    PROCEDURE IN DETAIL:  Patient brought to the operating room, intubated, turned prone.  Back was prepped and draped.  Made an incision thoracic area of T11-12, exposed the level, put lead up into the thoracic area going upwards, covered the T11-12 space.  This was sutured with 0 Nylon suture once the lead was nicely crossing over the T9-10 space it was a Nevro lead, it was then tunneled toward the flank incision on the right side where the pouch was created, was attached to the battery, secured tight and the wound was irrigated was obtained.  Wound was closed with 0 Vicryl, 3-0 Vicryl running subcu.  There were no complications from my standpoint.        ______________________________  Lavon Rosenbaum MD    IM/UD  DD:  03/16/2018  Time:  06:11PM  DT:  03/19/2018  Time:  10:13AM  Job #:  [id number]

## 2022-04-30 NOTE — OP NOTE
DATE OF SURGERY:    11/14/2017    SURGEON:  Waldemar Finley DPM    PREOPERATIVE DIAGNOSIS:  Symptomatic hardware x2, right calcaneus.    POSTOPERATIVE DIAGNOSIS:  Symptomatic hardware x2, right calcaneus.    OPERATION PERFORMED:  Removal of symptomatic hardware x2, right calcaneus.    ANESTHESIA:  General with local.    HEMOSTASIS:  Pneumatic calf tourniquet.    ESTIMATED BLOOD LOSS:  Minimal.    MATERIALS:  None.    INJECTABLES:  20 cc of a 1:1 mixture of 1% lidocaine plain and 0.5% Marcaine plain.    PROCEDURE IN DETAIL:  Under mild sedation, the patient was brought into the operating room and placed on the operating table in a prone position.  Pneumatic calf tourniquet was then placed about the patient's right calf.  Following IV sedation, local anesthesia was obtained about the right ankle and leg utilizing 20 cc of a 1:1 mixture of 1% lidocaine plain and 0.5% Marcaine plain.  The foot was then scrubbed, prepped, and draped in the usual aseptic manner.  An Esmarch bandage was utilized to exsanguinate the patient's right foot, and the pneumatic calf tourniquet was inflated to 275 mmHg.     Attention was then directed to the posterior aspect of the right heel, at which time, two 2 cm vertical incisions were made centered over the anchor placement at the posterior aspect of the calcaneus.  The incision placement was obtained by utilizing intraoperative fluoroscopy to assess the position of our anchors.  At this time, the incisions were then deepened down to bone, and the Achilles tendon was then minimally dissected the least amount possible to expose the underlying bone of the calcaneus, and, next, the small osteotome and mallet, along with rongeur, were then utilized to identify the Arthrex corkscrew anchor.  Next, the anchor handle was then utilized to back the anchor out and remove it in toto, which was then passed from the operative field for both anchors x2.  The wounds were then flushed with  copious amounts of sterile normal saline.  The tendon was then reapproximated and coapted utilizing 2-0 Vicryl, and skin closure was obtained utilizing 4-0 nylon in simple interrupted suture technique.     Upon completion of the procedures, the incisions were dressed with Xeroform and covered with sterile compressive dressings consisting of 4 x 4's and Kerlix.  The pneumatic calf tourniquet was then deflated. An Ace wrap and a [place] boot were then applied.     The patient tolerated the procedures and anesthesia well.  She was transferred to the recovery room with vital signs stable and vascular status intact to all toes of the right foot.  Following a period of postoperative monitoring, the patient will be discharged home per anesthesia.        ______________________________  NELLY JohnUF  DD:  11/17/2017  Time:  10:21PM  DT:  11/19/2017  Time:  09:09AM  Job #:  [id number]

## 2022-04-30 NOTE — OP NOTE
Patient:   Belen Andrews            MRN: [id number]            FIN: [id number]               Age:   57 years     Sex:  Female     :  1960   Associated Diagnoses:   Dorsalgia of lumbar region; Lumbar radiculopathy   Author:   Keven ISLAS, Deepa Swift      Operative Note   Operative Information   Date/ Time:  3/16/2018 16:06:00.     Procedures Performed: T11/12 laminectomy with placement of Nevro SCS lead to top  of T9, tunneling of leads to battery puch in right flank, using C-arm fluoroscopy..     Indications: dictated.     Preoperative Diagnosis: Dorsalgia of lumbar region ([id number] M54.5), Lumbar radiculopathy ([id number] M54.16).     Postoperative Diagnosis: Dorsalgia of lumbar region ([id number] M54.5), Lumbar radiculopathy ([id number] M54.16).     Surgeon: Lavon Rosenbaum MD.     Assistant: Keven ISLAS, Deepa Swift.     Anesthesia: GETA.     Description of Procedure/Findings/    Complications: dictated  .     Esimated blood loss: loss  10  cc.     Findings: dictated  .     Complications: None.

## 2022-04-30 NOTE — OP NOTE
DATE OF SURGERY:    05/02/2017    SURGEON:  Waldemar Finley DPM    PREOPERATIVE DIAGNOSIS:    1. Achilles tendon tear right foot.  2. Retrocalcaneal exostosis right foot.    POSTOPERATIVE DIAGNOSIS:    1. Achilles tendon tear right foot.  2. Retrocalcaneal exostosis right foot.    PROCEDURE:    1. Primary Achilles tendon repair right foot.  2. Retrocalcaneal exostectomy right foot.  3. Application of a posterior splint right leg.   Anesthesia:  General with local.  Hemostasis:  Pneumatic thigh tourniquet.  Estimated blood loss:  Minimal.  Materials:  Arthrex 5.5 corkscrew anchor x two.  Injectables:  20 ml of a 1:1 mixture of 1% lidocaine plain and 0.5% Marcaine plain.    PROCEDURE IN DETAIL:  Under mild sedation the patient was brought into the operating room and placed on the operating table in a prone position.  The pneumatic thigh tourniquet was placed about the patient's right thigh.  Following IV sedation local anesthesia was obtained about the right leg utilizing 20 ml of a 1:1 mixture of 1% lidocaine plain and and 0.5% Marcaine plain.  The foot was then scrubbed, prepped and draped in the usual aseptic manner.  An Esmarch bandage was utilized to exsanguinate the patient's right foot and leg and the pneumatic thigh tourniquet was inflated to 300 mmHg.       Attention was then directed to the posterior aspect of the right Achilles posterior insertion site, at which time a 5 cm linear longitudinal incision was made medial to the Achilles tendon border.  The incision was deepened through the subcutaneous tissues using sharp and blunt dissection.  Care was taken to identify and retract all vital neural and vascular structures.  All bleeders were ligated and cauterized as necessary.  At this time a U-type incision was made along the distal aspect insertion site of the Achilles tendon medially, laterally and distally and at this time the Achilles tendon was then freed off of the posterior aspect of the  calcaneus as well as the spur present at this site utilizing sharp dissection.  At this time the tendon was completely dissected free and reflected superiorly, thus exposing the spur at the operative site.  At this time an osteotome and mallet was then utilized to resect the spur in total, which was removed and passed from the operative field.  The posterior superior aspect of the calcaneus was also removed in this fashion as well to relieve any anterior pressure on the Achilles tendon.  The posterior aspect of the Achilles was then smoothed of any rough or prominent edges with a hand rasp and the wound was then flushed with copious amounts of sterile normal saline.  Intraoperative fluoroscopy was utilized at this time and showed complete removal of any spurs or bony prominences at the posterior aspect of the calcaneus.  At this time the attention was directed to the Achilles tendon and it was noted to have a small distal tear of the Achilles at the insertion with some thickening of the tendon at this site as well.  This was resected utilizing sharp dissection and the tendinosis noted at the distal anterior aspect of the Achilles tendon along with the split thickness tear was resected in total and passed from the operative field and sent for pathology.  At this time attention was directed back to the posterior aspect of the calcaneus and at this time two 5.5 mm Arthrex corkscrew anchors were then inserted into the posterior aspect of the calcaneus.  The insertion of the anchors were noted to be superior to the original insertion of the Achilles to decrease tension on the Achilles tendon postoperatively.  At this time the tendon was reattached back to the bone utilizing the FiberWire from the anchor in a buried suture technique.  After reattachment of the Achilles tendon the borders medially laterally and distally were reapproximated and coapted and repaired utilizing 2-0 Vicryl in simple interrupted suture  technique.  At this time the wound was flushed again with copious amounts of sterile normal saline and the skin closure was obtained utilizing 4-0 Monocryl and 4-0 nylon in horizontal mattress suture technique.     Upon completion of the procedure the incision was dressed with Xeroform and covered with sterile compressive dressings consisting of 4 x 4s and Kerlix.  The pneumatic thigh tourniquet was then deflated.  A posterior splint and Ace wrap was then applied.     The patient tolerated the procedures and anesthesia well.  She was transferred to the recovery room with vital signs stable and vascular status intact to all toes of the right foot.  Following a period of postoperative monitoring the patient will be discharged home per anesthesia.        ______________________________  NELLY John  DD:  05/08/2017  Time:  05:34PM  DT:  05/09/2017  Time:  11:06AM  Job #:  [id number]

## 2022-09-21 ENCOUNTER — HISTORICAL (OUTPATIENT)
Dept: ADMINISTRATIVE | Facility: HOSPITAL | Age: 62
End: 2022-09-21

## 2022-11-01 DIAGNOSIS — M47.817 LUMBOSACRAL SPONDYLOSIS WITHOUT MYELOPATHY: Primary | ICD-10-CM

## 2022-11-21 ENCOUNTER — HOSPITAL ENCOUNTER (OUTPATIENT)
Dept: RADIOLOGY | Facility: HOSPITAL | Age: 62
Discharge: HOME | End: 2022-11-21
Attending: NEUROLOGICAL SURGERY
Payer: COMMERCIAL

## 2022-11-21 DIAGNOSIS — M47.817 LUMBOSACRAL SPONDYLOSIS WITHOUT MYELOPATHY: ICD-10-CM

## 2022-11-21 PROCEDURE — 72131 CT LUMBAR SPINE W/O DYE: CPT | Mod: TC

## 2023-02-01 ENCOUNTER — DOCUMENTATION ONLY (OUTPATIENT)
Dept: ADMINISTRATIVE | Facility: HOSPITAL | Age: 63
End: 2023-02-01
Payer: COMMERCIAL

## 2023-03-27 ENCOUNTER — HOSPITAL ENCOUNTER (OUTPATIENT)
Dept: RADIOLOGY | Facility: HOSPITAL | Age: 63
Discharge: HOME | End: 2023-03-27
Attending: FAMILY MEDICINE
Payer: COMMERCIAL

## 2023-03-27 DIAGNOSIS — S39.92XA TAILBONE INJURY, INITIAL ENCOUNTER: Primary | ICD-10-CM

## 2023-03-27 DIAGNOSIS — S39.92XA TAILBONE INJURY, INITIAL ENCOUNTER: ICD-10-CM

## 2023-03-27 PROCEDURE — 72100 X-RAY EXAM L-S SPINE 2/3 VWS: CPT | Mod: TC

## 2023-07-11 ENCOUNTER — HOSPITAL ENCOUNTER (OUTPATIENT)
Dept: RADIOLOGY | Facility: HOSPITAL | Age: 63
Discharge: HOME | End: 2023-07-11
Attending: NEUROLOGICAL SURGERY
Payer: COMMERCIAL

## 2023-07-11 DIAGNOSIS — M47.817 LUMBAR AND SACRAL ARTHRITIS: ICD-10-CM

## 2023-07-11 DIAGNOSIS — M47.817 LUMBAR AND SACRAL ARTHRITIS: Primary | ICD-10-CM

## 2023-07-11 PROCEDURE — 72114 X-RAY EXAM L-S SPINE BENDING: CPT | Mod: TC

## 2023-07-27 DIAGNOSIS — M47.817 LUMBOSACRAL SPONDYLOSIS WITHOUT MYELOPATHY: Primary | ICD-10-CM

## 2023-08-01 ENCOUNTER — HOSPITAL ENCOUNTER (OUTPATIENT)
Dept: RADIOLOGY | Facility: HOSPITAL | Age: 63
Discharge: HOME | End: 2023-08-01
Attending: NEUROLOGICAL SURGERY
Payer: COMMERCIAL

## 2023-08-01 DIAGNOSIS — M47.817 LUMBOSACRAL SPONDYLOSIS WITHOUT MYELOPATHY: ICD-10-CM

## 2023-08-01 PROCEDURE — 72131 CT LUMBAR SPINE W/O DYE: CPT | Mod: TC

## 2023-08-23 DIAGNOSIS — M25.552 LEFT HIP PAIN: Primary | ICD-10-CM

## 2023-09-22 ENCOUNTER — CLINICAL SUPPORT (OUTPATIENT)
Dept: RESPIRATORY THERAPY | Facility: HOSPITAL | Age: 63
End: 2023-09-22
Attending: NURSE PRACTITIONER
Payer: COMMERCIAL

## 2023-09-22 ENCOUNTER — HOSPITAL ENCOUNTER (OUTPATIENT)
Dept: RADIOLOGY | Facility: HOSPITAL | Age: 63
Discharge: HOME | End: 2023-09-22
Attending: NURSE PRACTITIONER
Payer: COMMERCIAL

## 2023-09-22 DIAGNOSIS — Z01.811 PRE-OP CHEST EXAM: ICD-10-CM

## 2023-09-22 DIAGNOSIS — R94.31 EKG ABNORMALITIES: Primary | ICD-10-CM

## 2023-09-22 DIAGNOSIS — R94.31 EKG ABNORMALITIES: ICD-10-CM

## 2023-09-22 PROCEDURE — 93005 ELECTROCARDIOGRAM TRACING: CPT

## 2023-09-22 PROCEDURE — 71046 X-RAY EXAM CHEST 2 VIEWS: CPT | Mod: TC

## 2023-12-01 ENCOUNTER — HOSPITAL ENCOUNTER (EMERGENCY)
Facility: HOSPITAL | Age: 63
Discharge: HOME | End: 2023-12-01
Attending: EMERGENCY MEDICINE
Payer: COMMERCIAL

## 2023-12-01 VITALS
OXYGEN SATURATION: 95 % | BODY MASS INDEX: 33.32 KG/M2 | HEIGHT: 65 IN | RESPIRATION RATE: 17 BRPM | DIASTOLIC BLOOD PRESSURE: 66 MMHG | HEART RATE: 99 BPM | TEMPERATURE: 98 F | WEIGHT: 200 LBS | SYSTOLIC BLOOD PRESSURE: 117 MMHG

## 2023-12-01 DIAGNOSIS — E86.0 DEHYDRATION: ICD-10-CM

## 2023-12-01 DIAGNOSIS — R25.2 MUSCLE CRAMPS: Primary | ICD-10-CM

## 2023-12-01 LAB
ALBUMIN SERPL-MCNC: 4 G/DL (ref 3.4–4.8)
ALBUMIN/GLOB SERPL: 1.2 RATIO (ref 1.1–2)
ALP SERPL-CCNC: 108 UNIT/L (ref 40–150)
ALT SERPL-CCNC: 15 UNIT/L (ref 0–55)
AST SERPL-CCNC: 25 UNIT/L (ref 5–34)
BASOPHILS # BLD AUTO: 0.03 X10(3)/MCL
BASOPHILS NFR BLD AUTO: 0.5 %
BILIRUB SERPL-MCNC: 0.6 MG/DL
BUN SERPL-MCNC: 24 MG/DL (ref 9.8–20.1)
CALCIUM SERPL-MCNC: 9.8 MG/DL (ref 8.4–10.2)
CHLORIDE SERPL-SCNC: 105 MMOL/L (ref 98–107)
CO2 SERPL-SCNC: 26 MMOL/L (ref 23–31)
CREAT SERPL-MCNC: 1.08 MG/DL (ref 0.55–1.02)
EOSINOPHIL # BLD AUTO: 0.06 X10(3)/MCL (ref 0–0.9)
EOSINOPHIL NFR BLD AUTO: 1 %
ERYTHROCYTE [DISTWIDTH] IN BLOOD BY AUTOMATED COUNT: 13.2 % (ref 11.5–17)
GFR SERPLBLD CREATININE-BSD FMLA CKD-EPI: 58 MLS/MIN/1.73/M2
GLOBULIN SER-MCNC: 3.3 GM/DL (ref 2.4–3.5)
GLUCOSE SERPL-MCNC: 134 MG/DL (ref 82–115)
HCT VFR BLD AUTO: 42.6 % (ref 37–47)
HGB BLD-MCNC: 14.1 G/DL (ref 12–16)
IMM GRANULOCYTES # BLD AUTO: 0.02 X10(3)/MCL (ref 0–0.04)
IMM GRANULOCYTES NFR BLD AUTO: 0.3 %
LYMPHOCYTES # BLD AUTO: 1.65 X10(3)/MCL (ref 0.6–4.6)
LYMPHOCYTES NFR BLD AUTO: 26.4 %
MAGNESIUM SERPL-MCNC: 1.9 MG/DL (ref 1.6–2.6)
MCH RBC QN AUTO: 28.4 PG (ref 27–31)
MCHC RBC AUTO-ENTMCNC: 33.1 G/DL (ref 33–36)
MCV RBC AUTO: 85.9 FL (ref 80–94)
MONOCYTES # BLD AUTO: 0.43 X10(3)/MCL (ref 0.1–1.3)
MONOCYTES NFR BLD AUTO: 6.9 %
NEUTROPHILS # BLD AUTO: 4.05 X10(3)/MCL (ref 2.1–9.2)
NEUTROPHILS NFR BLD AUTO: 64.9 %
PLATELET # BLD AUTO: 190 X10(3)/MCL (ref 130–400)
PMV BLD AUTO: 10.9 FL (ref 7.4–10.4)
POTASSIUM SERPL-SCNC: 4 MMOL/L (ref 3.5–5.1)
PROT SERPL-MCNC: 7.3 GM/DL (ref 5.8–7.6)
RBC # BLD AUTO: 4.96 X10(6)/MCL (ref 4.2–5.4)
SODIUM SERPL-SCNC: 141 MMOL/L (ref 136–145)
WBC # SPEC AUTO: 6.24 X10(3)/MCL (ref 4.5–11.5)

## 2023-12-01 PROCEDURE — 93010 EKG 12-LEAD: ICD-10-PCS | Mod: ,,, | Performed by: INTERNAL MEDICINE

## 2023-12-01 PROCEDURE — 96361 HYDRATE IV INFUSION ADD-ON: CPT

## 2023-12-01 PROCEDURE — 99284 EMERGENCY DEPT VISIT MOD MDM: CPT | Mod: 25

## 2023-12-01 PROCEDURE — 93005 ELECTROCARDIOGRAM TRACING: CPT

## 2023-12-01 PROCEDURE — 85025 COMPLETE CBC W/AUTO DIFF WBC: CPT | Performed by: NURSE PRACTITIONER

## 2023-12-01 PROCEDURE — 25000003 PHARM REV CODE 250: Performed by: NURSE PRACTITIONER

## 2023-12-01 PROCEDURE — 96375 TX/PRO/DX INJ NEW DRUG ADDON: CPT

## 2023-12-01 PROCEDURE — 93010 ELECTROCARDIOGRAM REPORT: CPT | Mod: ,,, | Performed by: INTERNAL MEDICINE

## 2023-12-01 PROCEDURE — 96374 THER/PROPH/DIAG INJ IV PUSH: CPT

## 2023-12-01 PROCEDURE — 80053 COMPREHEN METABOLIC PANEL: CPT | Performed by: NURSE PRACTITIONER

## 2023-12-01 PROCEDURE — 83735 ASSAY OF MAGNESIUM: CPT | Performed by: NURSE PRACTITIONER

## 2023-12-01 PROCEDURE — 63600175 PHARM REV CODE 636 W HCPCS: Performed by: NURSE PRACTITIONER

## 2023-12-01 PROCEDURE — 63600175 PHARM REV CODE 636 W HCPCS

## 2023-12-01 RX ORDER — METHOCARBAMOL 750 MG/1
1500 TABLET, FILM COATED ORAL 3 TIMES DAILY
Qty: 30 TABLET | Refills: 0 | Status: SHIPPED | OUTPATIENT
Start: 2023-12-01 | End: 2023-12-06

## 2023-12-01 RX ORDER — LORAZEPAM 2 MG/ML
INJECTION INTRAMUSCULAR
Status: COMPLETED
Start: 2023-12-01 | End: 2023-12-01

## 2023-12-01 RX ORDER — METHOCARBAMOL 750 MG/1
1500 TABLET, FILM COATED ORAL ONCE
Status: COMPLETED | OUTPATIENT
Start: 2023-12-01 | End: 2023-12-01

## 2023-12-01 RX ORDER — KETOROLAC TROMETHAMINE 30 MG/ML
15 INJECTION, SOLUTION INTRAMUSCULAR; INTRAVENOUS
Status: COMPLETED | OUTPATIENT
Start: 2023-12-01 | End: 2023-12-01

## 2023-12-01 RX ORDER — ONDANSETRON 2 MG/ML
4 INJECTION INTRAMUSCULAR; INTRAVENOUS
Status: COMPLETED | OUTPATIENT
Start: 2023-12-01 | End: 2023-12-01

## 2023-12-01 RX ADMIN — KETOROLAC TROMETHAMINE 15 MG: 30 INJECTION, SOLUTION INTRAMUSCULAR; INTRAVENOUS at 12:12

## 2023-12-01 RX ADMIN — ONDANSETRON 4 MG: 2 INJECTION INTRAMUSCULAR; INTRAVENOUS at 01:12

## 2023-12-01 RX ADMIN — LORAZEPAM 2 MG: 2 INJECTION INTRAMUSCULAR; INTRAVENOUS at 01:12

## 2023-12-01 RX ADMIN — METHOCARBAMOL 1500 MG: 750 TABLET ORAL at 12:12

## 2023-12-01 RX ADMIN — SODIUM CHLORIDE 1000 ML: 9 INJECTION, SOLUTION INTRAVENOUS at 12:12

## 2023-12-01 NOTE — ED PROVIDER NOTES
Encounter Date: 12/1/2023       History     Chief Complaint   Patient presents with    Muscle Pain     Brought per [facility] for severe muscle cramps to arms and legs, states last time this happened her potassium was low     Patient is a 63-year-old female who presents to the emergency department with complaints of severe muscle cramps to the arms that started while she was in the grocery store.  She states they started mildly late last night but she was grocery shopping will got acutely worse.  She comes in per EMS with complaints of headache and cramping to bilateral upper extremities.  She denies any fevers chills.  She denies any abdominal pain nausea vomiting diarrhea.       Review of patient's allergies indicates:   Allergen Reactions    Oxycontin [oxycodone]      Past Medical History:   Diagnosis Date    Personal history of colonic polyps 09/03/2019     Past Surgical History:   Procedure Laterality Date    COLONOSCOPY  09/03/2019     No family history on file.  Social History     Tobacco Use    Smoking status: Never    Smokeless tobacco: Never     Review of Systems   Constitutional:  Negative for activity change, appetite change and fever.   HENT:  Negative for congestion, dental problem and sore throat.    Eyes:  Negative for discharge and itching.   Respiratory:  Negative for apnea, chest tightness and shortness of breath.    Cardiovascular:  Negative for chest pain.   Gastrointestinal:  Negative for abdominal distention, abdominal pain and nausea.   Endocrine: Negative for cold intolerance and heat intolerance.   Genitourinary:  Negative for dysuria, vaginal bleeding, vaginal discharge and vaginal pain.   Musculoskeletal:  Negative for back pain.   Skin:  Negative for rash.   Neurological:  Negative for weakness.   Hematological:  Does not bruise/bleed easily.   Psychiatric/Behavioral:  Negative for agitation and behavioral problems.    All other systems reviewed and are negative.      Physical Exam      Initial Vitals [12/01/23 1224]   BP Pulse Resp Temp SpO2   (!) 145/70 86 20 97.5 °F (36.4 °C) 95 %      MAP       --         Physical Exam    Nursing note and vitals reviewed.  Constitutional: Vital signs are normal. She appears well-developed and well-nourished.  Non-toxic appearance. She does not have a sickly appearance.   HENT:   Head: Normocephalic and atraumatic.   Right Ear: External ear normal.   Left Ear: External ear normal.   Eyes: Conjunctivae, EOM and lids are normal. Pupils are equal, round, and reactive to light. Lids are everted and swept, no foreign bodies found.   Neck: Trachea normal and phonation normal. Neck supple. No thyroid mass and no thyromegaly present.   Normal range of motion.   Full passive range of motion without pain.     Cardiovascular:  Normal rate, regular rhythm, S1 normal, S2 normal, normal heart sounds, intact distal pulses and normal pulses.           Pulmonary/Chest: Breath sounds normal.   Abdominal: Abdomen is soft. There is no abdominal tenderness.   Musculoskeletal:         General: No edema.      Cervical back: Full passive range of motion without pain, normal range of motion and neck supple.      Comments: Bilateral upper extremities are in a fixed position and cramping.  Pulses intact and palpable.     Lymphadenopathy:     She has no cervical adenopathy.   Neurological: She is alert and oriented to person, place, and time. She has normal strength. GCS score is 15. GCS eye subscore is 4. GCS verbal subscore is 5. GCS motor subscore is 6.   Skin: Skin is warm, dry and intact. Capillary refill takes less than 2 seconds.   Psychiatric: She has a normal mood and affect. Her speech is normal and behavior is normal. Judgment normal. Cognition and memory are normal.         ED Course   Procedures  Labs Reviewed   COMPREHENSIVE METABOLIC PANEL - Abnormal; Notable for the following components:       Result Value    Glucose Level 134 (*)     Blood Urea Nitrogen 24.0 (*)      Creatinine 1.08 (*)     All other components within normal limits   CBC WITH DIFFERENTIAL - Abnormal; Notable for the following components:    MPV 10.9 (*)     All other components within normal limits   MAGNESIUM - Normal   CBC W/ AUTO DIFFERENTIAL    Narrative:     The following orders were created for panel order CBC auto differential.  Procedure                               Abnormality         Status                     ---------                               -----------         ------                     CBC with Differential[[id number]]       Abnormal            Final result                 Please view results for these tests on the individual orders.     EKG Readings: (Independently Interpreted)   EKG done at 12:38 p.m. and reads normal sinus rhythm.  Possible left atrial enlargement.  Nonspecific ST abnormality.  Abnormal ECG.  Ventral rate of 98 beats per minute. Seen and interpreted by Dr. Deangelo Miller.      ECG Results              EKG 12-lead (Final result)  Result time 12/01/23 22:01:19      Final result by Interface, Lab In [facility] (12/01/23 22:01:19)                   Narrative:    Test Reason : R07.9,    Vent. Rate : 098 BPM     Atrial Rate : 098 BPM     P-R Int : 112 ms          QRS Dur : 070 ms      QT Int : 356 ms       P-R-T Axes : 061 061 054 degrees     QTc Int : 454 ms    Normal sinus rhythm  Possible Left atrial enlargement  Nonspecific ST abnormality  Abnormal ECG  When compared with ECG of 22-SEP-2023 11:30,  Questionable change in The axis  Confirmed by Jean Marie COWAN, Shravan ([id number]) on 12/1/2023 10:01:06 PM    Referred By: [email or web address]   SELF           Confirmed By:Shravan Aj MD                                  Imaging Results    None          Medications   sodium chloride 0.9% bolus 1,000 mL 1,000 mL (0 mLs Intravenous Stopped 12/1/23 1858)   methocarbamoL tablet 1,500 mg (1,500 mg Oral Given 12/1/23 1256)   ketorolac injection 15 mg (15 mg Intravenous Given 12/1/23 1257)   ondansetron injection 4  mg (4 mg Intravenous Given 12/1/23 1320)   LORazepam (ATIVAN) 2 mg/mL injection (2 mg  Given 12/1/23 1336)     Medical Decision Making  Patient is a 63-year-old female who presents to the emergency department with complaints of severe muscle cramps to the arms that started while she was in the grocery store.  She states they started mildly late last night but she was grocery shopping will got acutely worse.  She comes in per EMS with complaints of headache and cramping to bilateral upper extremities.  She denies any fevers chills.  She denies any abdominal pain nausea vomiting diarrhea.         Problems Addressed:  Dehydration: acute illness or injury     Details: IV be hydration done here with IV fluids.  Will discharge home with instructions to continue hydration by mouth.  Advised the would recommend using water for rehydration avoid Cokes COVID teeth, so those or other sports drinks unless using electrolyte supplements.  Patient was aware of plan of care and had no other questions or concerns prior to discharge.  Muscle cramps: acute illness or injury     Details: Initially Robaxin did help but her symptoms returned when her daughter came at bedside so we gave her some Ativan which again resolved the issue for short time until another granddaughter came into the room and then her symptoms began again.  I do not have a clear source of her muscle cramps but her magnesium potassium and electrolytes are normal here so we will discharge her home with Robaxin so she can follow up with her primary care further workup for the cause of these muscle cramps.    Amount and/or Complexity of Data Reviewed  Labs: ordered. Decision-making details documented in ED Course.    Risk  Prescription drug management.               ED Course as of 12/03/23 1737   Fri Dec 01, 2023   9263 Patient had an episode where all the sudden she was very red in the face and straining and now complaining that pain had moved to the back of her legs  which only began when her daughter was at the bedside.  Initially she was very uncomfortable and when she got in the room nose alone she was relaxed and talking to me but acutely began having this episode.  IV Ativan was given which has resolved this should this time.  Her potassium is normal level and her magnesium is normal of with no signs of infection.  She does have elevation in BUN and creatinine which we did give IV fluids for. [SL]   1417 Now heard granddaughter is bedside her symptoms have again returned.  Initially her symptoms were resolved with Ativan IV.  I do not have a clear explanation why she is having the pain in her legs and her arms but she did not have an injury there is no redness swelling to the extremities she has good pulses and good cap refill and her electrolytes which were initially the main concern or perfectly normal here.  She was mildly dehydrated which we did give her IV fluids.  Will discuss continue IV fluid hydration at home.  Will send home with Robaxin to continue.  Strict ED return precautions discussed for any changing worsening symptoms.   [SL]      ED Course User Index  [SL] Alejandro Pitt FNP                           Clinical Impression:  Final diagnoses:  [R25.2] Muscle cramps (Primary)  [E86.0] Dehydration          ED Disposition Condition    Discharge Stable          ED Prescriptions       Medication Sig Dispense Start Date End Date Auth. Provider    methocarbamoL (ROBAXIN) 750 MG Tab Take 2 tablets (1,500 mg total) by mouth 3 (three) times daily. for 5 days 30 tablet 12/1/2023 12/6/2023 Alejandro Pitt FNP          Follow-up Information       Follow up With Specialties Details Why Contact Info    Remy Brandon MD Family Medicine Schedule an appointment as soon as possible for a visit  As needed, For wound re-check [address] [facility]  [address]  [place] [address]  [phone]               Alejandro Pitt FNP  12/03/23 [id number]

## 2024-01-17 DIAGNOSIS — R06.02 SHORTNESS OF BREATH: ICD-10-CM

## 2024-01-17 DIAGNOSIS — J44.9 LUNG DISEASE, OBSTRUCTIVE: Primary | ICD-10-CM

## 2024-01-31 ENCOUNTER — PROCEDURE VISIT (OUTPATIENT)
Dept: RESPIRATORY THERAPY | Facility: HOSPITAL | Age: 64
End: 2024-01-31
Attending: INTERNAL MEDICINE
Payer: COMMERCIAL

## 2024-01-31 ENCOUNTER — HOSPITAL ENCOUNTER (OUTPATIENT)
Dept: RADIOLOGY | Facility: HOSPITAL | Age: 64
Discharge: HOME | End: 2024-01-31
Attending: INTERNAL MEDICINE
Payer: COMMERCIAL

## 2024-01-31 DIAGNOSIS — R06.02 SHORTNESS OF BREATH: ICD-10-CM

## 2024-01-31 DIAGNOSIS — J44.9 COPD (CHRONIC OBSTRUCTIVE PULMONARY DISEASE): ICD-10-CM

## 2024-01-31 DIAGNOSIS — J44.9 LUNG DISEASE, OBSTRUCTIVE: ICD-10-CM

## 2024-01-31 DIAGNOSIS — J44.9 COPD (CHRONIC OBSTRUCTIVE PULMONARY DISEASE): Primary | ICD-10-CM

## 2024-01-31 PROCEDURE — 71046 X-RAY EXAM CHEST 2 VIEWS: CPT | Mod: TC

## 2024-01-31 PROCEDURE — 94010 BREATHING CAPACITY TEST: CPT

## 2024-01-31 PROCEDURE — 94727 GAS DIL/WSHOT DETER LNG VOL: CPT

## 2024-01-31 PROCEDURE — 94729 DIFFUSING CAPACITY: CPT

## 2024-01-31 NOTE — PROGRESS NOTES
PT SHORT OF BREATH AFTER EACH TEST/ PT ALSO HAD A CONSISTENT BRONCHOSPASTIC COUGH    GREAT PT EFFORT AND COOPERATION    PFT COMPLETE

## 2024-06-03 ENCOUNTER — LAB VISIT (OUTPATIENT)
Dept: LAB | Facility: HOSPITAL | Age: 64
End: 2024-06-03
Attending: INTERNAL MEDICINE
Payer: COMMERCIAL

## 2024-06-03 DIAGNOSIS — J45.901 EXTRINSIC ASTHMA WITH EXACERBATION: Primary | ICD-10-CM

## 2024-06-03 LAB
BASOPHILS # BLD AUTO: 0.03 X10(3)/MCL
BASOPHILS NFR BLD AUTO: 0.6 %
EOSINOPHIL # BLD AUTO: 0.12 X10(3)/MCL (ref 0–0.9)
EOSINOPHIL NFR BLD AUTO: 2.3 %
ERYTHROCYTE [DISTWIDTH] IN BLOOD BY AUTOMATED COUNT: 13.2 % (ref 11.5–17)
HCT VFR BLD AUTO: 43.3 % (ref 37–47)
HGB BLD-MCNC: 13.8 G/DL (ref 12–16)
IMM GRANULOCYTES # BLD AUTO: 0.02 X10(3)/MCL (ref 0–0.04)
IMM GRANULOCYTES NFR BLD AUTO: 0.4 %
LYMPHOCYTES # BLD AUTO: 1.66 X10(3)/MCL (ref 0.6–4.6)
LYMPHOCYTES NFR BLD AUTO: 31.3 %
MCH RBC QN AUTO: 27.9 PG (ref 27–31)
MCHC RBC AUTO-ENTMCNC: 31.9 G/DL (ref 33–36)
MCV RBC AUTO: 87.5 FL (ref 80–94)
MONOCYTES # BLD AUTO: 0.38 X10(3)/MCL (ref 0.1–1.3)
MONOCYTES NFR BLD AUTO: 7.2 %
NEUTROPHILS # BLD AUTO: 3.09 X10(3)/MCL (ref 2.1–9.2)
NEUTROPHILS NFR BLD AUTO: 58.2 %
PLATELET # BLD AUTO: 82 X10(3)/MCL (ref 130–400)
PMV BLD AUTO: 12.6 FL (ref 7.4–10.4)
RBC # BLD AUTO: 4.95 X10(6)/MCL (ref 4.2–5.4)
WBC # SPEC AUTO: 5.3 X10(3)/MCL (ref 4.5–11.5)

## 2024-06-03 PROCEDURE — 85025 COMPLETE CBC W/AUTO DIFF WBC: CPT

## 2024-06-03 PROCEDURE — 36415 COLL VENOUS BLD VENIPUNCTURE: CPT

## 2024-06-05 ENCOUNTER — APPOINTMENT (OUTPATIENT)
Dept: LAB | Facility: HOSPITAL | Age: 64
End: 2024-06-05
Attending: INTERNAL MEDICINE
Payer: COMMERCIAL

## 2024-06-05 PROCEDURE — 36415 COLL VENOUS BLD VENIPUNCTURE: CPT

## 2024-06-07 LAB — PHADIOTOP IGE QN: 45.2 KU/L

## 2024-07-24 ENCOUNTER — LAB VISIT (OUTPATIENT)
Dept: LAB | Facility: HOSPITAL | Age: 64
End: 2024-07-24
Attending: NURSE PRACTITIONER
Payer: COMMERCIAL

## 2024-07-24 DIAGNOSIS — E11.40 DIABETIC NEUROPATHY: Primary | ICD-10-CM

## 2024-07-24 LAB
ANION GAP SERPL CALC-SCNC: 8 MEQ/L
BUN SERPL-MCNC: 17 MG/DL (ref 9.8–20.1)
CALCIUM SERPL-MCNC: 10 MG/DL (ref 8.4–10.2)
CHLORIDE SERPL-SCNC: 106 MMOL/L (ref 98–107)
CO2 SERPL-SCNC: 28 MMOL/L (ref 23–31)
CREAT SERPL-MCNC: 1 MG/DL (ref 0.55–1.02)
CREAT UR-MCNC: 297.7 MG/DL (ref 45–106)
CREAT/UREA NIT SERPL: 17
EST. AVERAGE GLUCOSE BLD GHB EST-MCNC: 105.4 MG/DL
GFR SERPLBLD CREATININE-BSD FMLA CKD-EPI: >60 ML/MIN/1.73/M2
GLUCOSE SERPL-MCNC: 93 MG/DL (ref 82–115)
HBA1C MFR BLD: 5.3 %
MICROALBUMIN UR-MCNC: 15.2 UG/ML
MICROALBUMIN/CREAT RATIO PNL UR: 5.1 MG/GM CR (ref 0–30)
POTASSIUM SERPL-SCNC: 3.5 MMOL/L (ref 3.5–5.1)
SODIUM SERPL-SCNC: 142 MMOL/L (ref 136–145)

## 2024-07-24 PROCEDURE — 82043 UR ALBUMIN QUANTITATIVE: CPT

## 2024-07-24 PROCEDURE — 80048 BASIC METABOLIC PNL TOTAL CA: CPT

## 2024-07-24 PROCEDURE — 83036 HEMOGLOBIN GLYCOSYLATED A1C: CPT

## 2024-07-24 PROCEDURE — 36415 COLL VENOUS BLD VENIPUNCTURE: CPT

## 2024-10-25 DIAGNOSIS — R13.10 DYSPHAGIA, UNSPECIFIED: ICD-10-CM

## 2024-10-25 DIAGNOSIS — R13.12 DYSPHAGIA, OROPHARYNGEAL PHASE: Primary | ICD-10-CM

## 2024-10-30 ENCOUNTER — CLINICAL SUPPORT (OUTPATIENT)
Dept: REHABILITATION | Facility: HOSPITAL | Age: 64
End: 2024-10-30
Attending: PHYSICIAN ASSISTANT
Payer: COMMERCIAL

## 2024-10-30 ENCOUNTER — HOSPITAL ENCOUNTER (OUTPATIENT)
Dept: RADIOLOGY | Facility: HOSPITAL | Age: 64
Discharge: HOME | End: 2024-10-30
Attending: PHYSICIAN ASSISTANT
Payer: COMMERCIAL

## 2024-10-30 DIAGNOSIS — R13.10 DYSPHAGIA, UNSPECIFIED: ICD-10-CM

## 2024-10-30 DIAGNOSIS — R13.12 DYSPHAGIA, OROPHARYNGEAL PHASE: ICD-10-CM

## 2024-10-30 PROCEDURE — 74230 X-RAY XM SWLNG FUNCJ C+: CPT | Mod: TC

## 2024-10-30 PROCEDURE — A9698 NON-RAD CONTRAST MATERIALNOC: HCPCS

## 2024-10-30 PROCEDURE — 25500020 PHARM REV CODE 255

## 2024-10-30 PROCEDURE — 92611 MOTION FLUOROSCOPY/SWALLOW: CPT

## 2024-10-30 RX ADMIN — BARIUM SULFATE 10 ML: 0.81 POWDER, FOR SUSPENSION ORAL at 01:10

## 2025-02-26 ENCOUNTER — HOSPITAL ENCOUNTER (OUTPATIENT)
Facility: HOSPITAL | Age: 65
Discharge: HOME | End: 2025-02-26
Attending: INTERNAL MEDICINE | Admitting: INTERNAL MEDICINE
Payer: COMMERCIAL

## 2025-02-26 VITALS
BODY MASS INDEX: 35.37 KG/M2 | DIASTOLIC BLOOD PRESSURE: 63 MMHG | HEIGHT: 65 IN | HEART RATE: 69 BPM | SYSTOLIC BLOOD PRESSURE: 121 MMHG | WEIGHT: 212.31 LBS | TEMPERATURE: 98 F | OXYGEN SATURATION: 99 %

## 2025-02-26 DIAGNOSIS — Z45.010 ENCOUNTER FOR PACEMAKER AT END OF BATTERY LIFE: ICD-10-CM

## 2025-02-26 PROCEDURE — 63600175 PHARM REV CODE 636 W HCPCS: Performed by: INTERNAL MEDICINE

## 2025-02-26 PROCEDURE — 25000003 PHARM REV CODE 250: Performed by: INTERNAL MEDICINE

## 2025-02-26 PROCEDURE — 33286 RMVL SUBQ CAR RHYTHM MNTR: CPT | Performed by: INTERNAL MEDICINE

## 2025-02-26 RX ORDER — SUCRALFATE 1 G/1
1 TABLET ORAL
COMMUNITY

## 2025-02-26 RX ORDER — EPINEPHRINE 0.3 MG/.3ML
1 INJECTION SUBCUTANEOUS
COMMUNITY
Start: 2024-09-27

## 2025-02-26 RX ORDER — LEVOTHYROXINE SODIUM 150 UG/1
150 TABLET ORAL
COMMUNITY
Start: 2022-07-20

## 2025-02-26 RX ORDER — IPRATROPIUM BROMIDE AND ALBUTEROL 20; 100 UG/1; UG/1
1 SPRAY, METERED RESPIRATORY (INHALATION) EVERY 6 HOURS PRN
COMMUNITY
Start: 2025-01-20

## 2025-02-26 RX ORDER — MIRABEGRON 25 MG/1
25 TABLET, FILM COATED, EXTENDED RELEASE ORAL DAILY
COMMUNITY
Start: 2022-07-20

## 2025-02-26 RX ORDER — FLUTICASONE FUROATE, UMECLIDINIUM BROMIDE AND VILANTEROL TRIFENATATE 200; 62.5; 25 UG/1; UG/1; UG/1
1 POWDER RESPIRATORY (INHALATION) DAILY
COMMUNITY
Start: 2024-06-27

## 2025-02-26 RX ORDER — DESLORATADINE 5 MG/1
5 TABLET ORAL DAILY
COMMUNITY

## 2025-02-26 RX ORDER — ALBUTEROL SULFATE 0.83 MG/ML
2.5 SOLUTION RESPIRATORY (INHALATION) EVERY 6 HOURS PRN
COMMUNITY
Start: 2024-09-30

## 2025-02-26 RX ORDER — PANTOPRAZOLE SODIUM 40 MG/1
40 TABLET, DELAYED RELEASE ORAL DAILY
COMMUNITY
Start: 2024-06-27

## 2025-02-26 RX ORDER — MELOXICAM 7.5 MG/1
7.5 TABLET ORAL DAILY
COMMUNITY
Start: 2024-08-27

## 2025-02-26 RX ORDER — LIDOCAINE HYDROCHLORIDE 10 MG/ML
INJECTION, SOLUTION INFILTRATION; PERINEURAL
Status: DISCONTINUED | OUTPATIENT
Start: 2025-02-26 | End: 2025-02-26 | Stop reason: HOSPADM

## 2025-02-26 RX ORDER — DIAZEPAM 5 MG/1
10 TABLET ORAL ONCE
Status: COMPLETED | OUTPATIENT
Start: 2025-02-26 | End: 2025-02-26

## 2025-02-26 RX ORDER — METOPROLOL SUCCINATE 25 MG/1
25 TABLET, EXTENDED RELEASE ORAL DAILY
COMMUNITY

## 2025-02-26 RX ORDER — FUROSEMIDE 40 MG/1
40 TABLET ORAL 2 TIMES DAILY
COMMUNITY
Start: 2024-09-30

## 2025-02-26 RX ORDER — BUSPIRONE HYDROCHLORIDE 15 MG/1
15 TABLET ORAL 2 TIMES DAILY
COMMUNITY

## 2025-02-26 RX ORDER — ERGOCALCIFEROL 1.25 MG/1
50000 CAPSULE ORAL
COMMUNITY
Start: 2024-10-30

## 2025-02-26 RX ORDER — MONTELUKAST SODIUM 10 MG/1
10 TABLET ORAL DAILY
COMMUNITY
Start: 2024-10-15

## 2025-02-26 RX ORDER — HYDROXYZINE HYDROCHLORIDE 50 MG/1
50 TABLET, FILM COATED ORAL 3 TIMES DAILY PRN
COMMUNITY
Start: 2024-08-27

## 2025-02-26 RX ORDER — ROSUVASTATIN CALCIUM 40 MG/1
40 TABLET, COATED ORAL DAILY
COMMUNITY
Start: 2025-02-05

## 2025-02-26 RX ORDER — BUDESONIDE AND FORMOTEROL FUMARATE DIHYDRATE 160; 4.5 UG/1; UG/1
2 AEROSOL RESPIRATORY (INHALATION) EVERY 12 HOURS
COMMUNITY
Start: 2024-09-30

## 2025-02-26 RX ORDER — APIXABAN 5 MG/1
5 TABLET, FILM COATED ORAL 2 TIMES DAILY
COMMUNITY

## 2025-02-26 RX ORDER — LANOLIN ALCOHOL/MO/W.PET/CERES
400 CREAM (GRAM) TOPICAL DAILY
COMMUNITY
Start: 2025-02-12

## 2025-02-26 RX ORDER — LIDOCAINE 560 MG/1
1 PATCH PERCUTANEOUS; TOPICAL; TRANSDERMAL DAILY
COMMUNITY
Start: 2024-12-09

## 2025-02-26 RX ORDER — GABAPENTIN 600 MG/1
600 TABLET ORAL 3 TIMES DAILY
COMMUNITY
Start: 2024-08-27

## 2025-02-26 RX ORDER — ONDANSETRON 4 MG/1
4 TABLET, ORALLY DISINTEGRATING ORAL EVERY 8 HOURS PRN
COMMUNITY

## 2025-02-26 RX ORDER — DEXLANSOPRAZOLE 30 MG/1
30 CAPSULE, DELAYED RELEASE ORAL DAILY
COMMUNITY
Start: 2024-10-15

## 2025-02-26 RX ORDER — PANCRELIPASE 36000; 180000; 114000 [USP'U]/1; [USP'U]/1; [USP'U]/1
1 CAPSULE, DELAYED RELEASE PELLETS ORAL 3 TIMES DAILY
COMMUNITY
Start: 2024-08-27

## 2025-02-26 RX ORDER — DULOXETIN HYDROCHLORIDE 60 MG/1
60 CAPSULE, DELAYED RELEASE ORAL 2 TIMES DAILY
COMMUNITY
Start: 2024-10-15

## 2025-02-26 RX ORDER — POTASSIUM CHLORIDE 20 MEQ/1
1 TABLET, EXTENDED RELEASE ORAL DAILY
COMMUNITY
Start: 2024-10-15

## 2025-02-26 RX ORDER — NITROGLYCERIN 0.4 MG/1
0.4 TABLET SUBLINGUAL EVERY 5 MIN PRN
COMMUNITY

## 2025-02-26 RX ORDER — LEVOCETIRIZINE DIHYDROCHLORIDE 5 MG/1
5 TABLET, FILM COATED ORAL NIGHTLY
COMMUNITY
Start: 2024-08-27

## 2025-02-26 RX ORDER — ASPIRIN 81 MG/1
81 TABLET ORAL DAILY
COMMUNITY
Start: 2022-07-20

## 2025-02-26 RX ADMIN — DIAZEPAM 10 MG: 5 TABLET ORAL at 02:02

## 2025-02-26 NOTE — DISCHARGE INSTRUCTIONS
Remove dressing in 3 days.If just a sterile glue was used do not pick at it, it will flake off like a scab over time.   Do not get the dressing/site wet for 3 days.   If incision looks red or swollen with any discharge coming out of it or if you start running a fever go back to your nearest emergency room.   Call the office if you have any questions [phone].   Resume Eliquis in 2 days if no bleeding or hematoma

## 2025-07-02 ENCOUNTER — LAB VISIT (OUTPATIENT)
Dept: LAB | Facility: HOSPITAL | Age: 65
End: 2025-07-02
Attending: NURSE PRACTITIONER
Payer: COMMERCIAL

## 2025-07-02 DIAGNOSIS — R60.9 EDEMA, UNSPECIFIED TYPE: Primary | ICD-10-CM

## 2025-07-02 LAB
ANION GAP SERPL CALC-SCNC: 5 MEQ/L
BUN SERPL-MCNC: 19.7 MG/DL (ref 9.8–20.1)
CALCIUM SERPL-MCNC: 8.9 MG/DL (ref 8.4–10.2)
CHLORIDE SERPL-SCNC: 109 MMOL/L (ref 98–107)
CO2 SERPL-SCNC: 28 MMOL/L (ref 23–31)
CREAT SERPL-MCNC: 1.62 MG/DL (ref 0.55–1.02)
CREAT/UREA NIT SERPL: 12
GFR SERPLBLD CREATININE-BSD FMLA CKD-EPI: 36 ML/MIN/1.73/M2
GLUCOSE SERPL-MCNC: 98 MG/DL (ref 82–115)
POTASSIUM SERPL-SCNC: 3.8 MMOL/L (ref 3.5–5.1)
SODIUM SERPL-SCNC: 142 MMOL/L (ref 136–145)

## 2025-07-02 PROCEDURE — 36415 COLL VENOUS BLD VENIPUNCTURE: CPT

## 2025-07-02 PROCEDURE — 80048 BASIC METABOLIC PNL TOTAL CA: CPT

## 2025-08-26 DIAGNOSIS — J34.3 NASAL TURBINATE HYPERTROPHY: ICD-10-CM

## 2025-08-26 DIAGNOSIS — R09.81 CHRONIC NASAL CONGESTION: ICD-10-CM

## 2025-08-26 DIAGNOSIS — J32.8 OTHER CHRONIC SINUSITIS: Primary | ICD-10-CM

## (undated) DEVICE — NDL HYPO REG 25G X 1 1/2

## (undated) DEVICE — GLOVE 8.0 PROTEXIS PI MICRO

## (undated) DEVICE — NDL BLUNT W/O FILTER 18GX1.5IN

## (undated) DEVICE — GLOVE 7.5 PROTEXIS PI MICRO

## (undated) DEVICE — APPLICATOR CHLORAPREP CLR 3ML

## (undated) DEVICE — DRESSING GAUZE 6PLY 4X4

## (undated) DEVICE — SYR DISP LL 5CC

## (undated) DEVICE — BLADE SURG STAINLESS STEEL #15

## (undated) DEVICE — TOWEL OR DISP STRL BLUE 4/PK

## (undated) DEVICE — DRAPE UTILITY W/ TAPE 20X30IN

## (undated) DEVICE — GOWN X-LG STERILE BACK